# Patient Record
Sex: MALE | Race: WHITE | NOT HISPANIC OR LATINO | Employment: FULL TIME | URBAN - METROPOLITAN AREA
[De-identification: names, ages, dates, MRNs, and addresses within clinical notes are randomized per-mention and may not be internally consistent; named-entity substitution may affect disease eponyms.]

---

## 2017-05-12 ENCOUNTER — TRANSCRIBE ORDERS (OUTPATIENT)
Dept: ADMINISTRATIVE | Facility: HOSPITAL | Age: 37
End: 2017-05-12

## 2017-05-12 ENCOUNTER — ALLSCRIPTS OFFICE VISIT (OUTPATIENT)
Dept: OTHER | Facility: OTHER | Age: 37
End: 2017-05-12

## 2017-05-12 DIAGNOSIS — G89.29 CHRONIC LEFT-SIDED LOW BACK PAIN WITH LEFT-SIDED SCIATICA: Primary | ICD-10-CM

## 2017-05-12 DIAGNOSIS — M54.42 CHRONIC LEFT-SIDED LOW BACK PAIN WITH LEFT-SIDED SCIATICA: Primary | ICD-10-CM

## 2017-05-12 DIAGNOSIS — M54.40 LUMBAGO WITH SCIATICA: ICD-10-CM

## 2017-05-27 ENCOUNTER — HOSPITAL ENCOUNTER (OUTPATIENT)
Dept: MRI IMAGING | Facility: HOSPITAL | Age: 37
Discharge: HOME | End: 2017-05-27
Payer: COMMERCIAL

## 2017-05-27 DIAGNOSIS — M54.40 LUMBAGO WITH SCIATICA: ICD-10-CM

## 2017-05-27 PROCEDURE — 72148 MRI LUMBAR SPINE W/O DYE: CPT

## 2017-05-30 ENCOUNTER — GENERIC CONVERSION - ENCOUNTER (OUTPATIENT)
Dept: OTHER | Facility: OTHER | Age: 37
End: 2017-05-30

## 2017-06-21 ENCOUNTER — ALLSCRIPTS OFFICE VISIT (OUTPATIENT)
Dept: OTHER | Facility: OTHER | Age: 37
End: 2017-06-21

## 2017-07-10 ENCOUNTER — ALLSCRIPTS OFFICE VISIT (OUTPATIENT)
Dept: OTHER | Facility: OTHER | Age: 37
End: 2017-07-10

## 2017-07-10 DIAGNOSIS — M51.16 INTERVERTEBRAL DISC DISORDER WITH RADICULOPATHY OF LUMBAR REGION: ICD-10-CM

## 2017-07-28 ENCOUNTER — ALLSCRIPTS OFFICE VISIT (OUTPATIENT)
Dept: RADIOLOGY | Facility: CLINIC | Age: 37
End: 2017-07-28
Payer: COMMERCIAL

## 2017-08-11 ENCOUNTER — APPOINTMENT (OUTPATIENT)
Dept: PHYSICAL THERAPY | Facility: CLINIC | Age: 37
End: 2017-08-11
Payer: COMMERCIAL

## 2017-08-11 DIAGNOSIS — M51.16 INTERVERTEBRAL DISC DISORDER WITH RADICULOPATHY OF LUMBAR REGION: ICD-10-CM

## 2017-08-15 ENCOUNTER — APPOINTMENT (OUTPATIENT)
Dept: PHYSICAL THERAPY | Facility: CLINIC | Age: 37
End: 2017-08-15
Payer: COMMERCIAL

## 2017-08-16 ENCOUNTER — GENERIC CONVERSION - ENCOUNTER (OUTPATIENT)
Dept: OTHER | Facility: OTHER | Age: 37
End: 2017-08-16

## 2017-08-21 ENCOUNTER — APPOINTMENT (OUTPATIENT)
Dept: PHYSICAL THERAPY | Facility: CLINIC | Age: 37
End: 2017-08-21
Payer: COMMERCIAL

## 2017-08-24 ENCOUNTER — APPOINTMENT (OUTPATIENT)
Dept: PHYSICAL THERAPY | Facility: CLINIC | Age: 37
End: 2017-08-24
Payer: COMMERCIAL

## 2017-08-24 PROCEDURE — 97140 MANUAL THERAPY 1/> REGIONS: CPT

## 2017-08-24 PROCEDURE — 97110 THERAPEUTIC EXERCISES: CPT

## 2017-08-31 ENCOUNTER — APPOINTMENT (OUTPATIENT)
Dept: PHYSICAL THERAPY | Facility: CLINIC | Age: 37
End: 2017-08-31
Payer: COMMERCIAL

## 2017-08-31 PROCEDURE — 97110 THERAPEUTIC EXERCISES: CPT

## 2017-08-31 PROCEDURE — 97140 MANUAL THERAPY 1/> REGIONS: CPT

## 2017-09-06 ENCOUNTER — APPOINTMENT (OUTPATIENT)
Dept: PHYSICAL THERAPY | Facility: CLINIC | Age: 37
End: 2017-09-06
Payer: COMMERCIAL

## 2017-09-06 PROCEDURE — 97110 THERAPEUTIC EXERCISES: CPT

## 2017-09-06 PROCEDURE — 97140 MANUAL THERAPY 1/> REGIONS: CPT

## 2017-09-14 ENCOUNTER — APPOINTMENT (OUTPATIENT)
Dept: PHYSICAL THERAPY | Facility: CLINIC | Age: 37
End: 2017-09-14
Payer: COMMERCIAL

## 2017-09-14 PROCEDURE — 97140 MANUAL THERAPY 1/> REGIONS: CPT

## 2017-09-14 PROCEDURE — 97110 THERAPEUTIC EXERCISES: CPT

## 2017-09-18 ENCOUNTER — APPOINTMENT (OUTPATIENT)
Dept: PHYSICAL THERAPY | Facility: CLINIC | Age: 37
End: 2017-09-18
Payer: COMMERCIAL

## 2017-09-18 PROCEDURE — 97140 MANUAL THERAPY 1/> REGIONS: CPT

## 2017-09-18 PROCEDURE — 97110 THERAPEUTIC EXERCISES: CPT

## 2017-09-21 ENCOUNTER — APPOINTMENT (OUTPATIENT)
Dept: PHYSICAL THERAPY | Facility: CLINIC | Age: 37
End: 2017-09-21
Payer: COMMERCIAL

## 2017-09-21 PROCEDURE — 97110 THERAPEUTIC EXERCISES: CPT

## 2017-09-21 PROCEDURE — 97140 MANUAL THERAPY 1/> REGIONS: CPT

## 2017-09-22 ENCOUNTER — ALLSCRIPTS OFFICE VISIT (OUTPATIENT)
Dept: RADIOLOGY | Facility: CLINIC | Age: 37
End: 2017-09-22
Payer: COMMERCIAL

## 2017-09-25 ENCOUNTER — APPOINTMENT (OUTPATIENT)
Dept: PHYSICAL THERAPY | Facility: CLINIC | Age: 37
End: 2017-09-25
Payer: COMMERCIAL

## 2017-09-28 ENCOUNTER — APPOINTMENT (OUTPATIENT)
Dept: PHYSICAL THERAPY | Facility: CLINIC | Age: 37
End: 2017-09-28
Payer: COMMERCIAL

## 2017-09-28 PROCEDURE — 97140 MANUAL THERAPY 1/> REGIONS: CPT

## 2017-09-28 PROCEDURE — 97110 THERAPEUTIC EXERCISES: CPT

## 2017-10-02 ENCOUNTER — APPOINTMENT (OUTPATIENT)
Dept: PHYSICAL THERAPY | Facility: CLINIC | Age: 37
End: 2017-10-02
Payer: COMMERCIAL

## 2017-10-02 PROCEDURE — 97140 MANUAL THERAPY 1/> REGIONS: CPT

## 2017-10-02 PROCEDURE — 97110 THERAPEUTIC EXERCISES: CPT

## 2017-10-05 ENCOUNTER — APPOINTMENT (OUTPATIENT)
Dept: PHYSICAL THERAPY | Facility: CLINIC | Age: 37
End: 2017-10-05
Payer: COMMERCIAL

## 2017-10-05 PROCEDURE — 97140 MANUAL THERAPY 1/> REGIONS: CPT

## 2017-10-05 PROCEDURE — 97110 THERAPEUTIC EXERCISES: CPT

## 2017-10-09 ENCOUNTER — APPOINTMENT (OUTPATIENT)
Dept: PHYSICAL THERAPY | Facility: CLINIC | Age: 37
End: 2017-10-09
Payer: COMMERCIAL

## 2017-10-09 PROCEDURE — 97110 THERAPEUTIC EXERCISES: CPT

## 2017-10-09 PROCEDURE — 97140 MANUAL THERAPY 1/> REGIONS: CPT

## 2017-10-12 ENCOUNTER — APPOINTMENT (OUTPATIENT)
Dept: PHYSICAL THERAPY | Facility: CLINIC | Age: 37
End: 2017-10-12
Payer: COMMERCIAL

## 2017-10-12 PROCEDURE — 97140 MANUAL THERAPY 1/> REGIONS: CPT

## 2017-10-12 PROCEDURE — 97110 THERAPEUTIC EXERCISES: CPT

## 2017-10-18 ENCOUNTER — APPOINTMENT (OUTPATIENT)
Dept: PHYSICAL THERAPY | Facility: CLINIC | Age: 37
End: 2017-10-18
Payer: COMMERCIAL

## 2017-10-18 PROCEDURE — 97140 MANUAL THERAPY 1/> REGIONS: CPT

## 2017-10-18 PROCEDURE — 97110 THERAPEUTIC EXERCISES: CPT

## 2017-10-23 ENCOUNTER — APPOINTMENT (OUTPATIENT)
Dept: PHYSICAL THERAPY | Facility: CLINIC | Age: 37
End: 2017-10-23
Payer: COMMERCIAL

## 2017-10-23 PROCEDURE — 97110 THERAPEUTIC EXERCISES: CPT

## 2017-10-23 PROCEDURE — 97140 MANUAL THERAPY 1/> REGIONS: CPT

## 2017-10-26 ENCOUNTER — APPOINTMENT (OUTPATIENT)
Dept: PHYSICAL THERAPY | Facility: CLINIC | Age: 37
End: 2017-10-26
Payer: COMMERCIAL

## 2017-10-26 PROCEDURE — 97140 MANUAL THERAPY 1/> REGIONS: CPT

## 2017-10-26 PROCEDURE — 97110 THERAPEUTIC EXERCISES: CPT

## 2017-10-30 ENCOUNTER — APPOINTMENT (OUTPATIENT)
Dept: PHYSICAL THERAPY | Facility: CLINIC | Age: 37
End: 2017-10-30
Payer: COMMERCIAL

## 2017-11-02 ENCOUNTER — APPOINTMENT (OUTPATIENT)
Dept: PHYSICAL THERAPY | Facility: CLINIC | Age: 37
End: 2017-11-02
Payer: COMMERCIAL

## 2017-11-02 PROCEDURE — 97110 THERAPEUTIC EXERCISES: CPT

## 2017-11-02 PROCEDURE — 97140 MANUAL THERAPY 1/> REGIONS: CPT

## 2017-11-09 ENCOUNTER — APPOINTMENT (OUTPATIENT)
Dept: PHYSICAL THERAPY | Facility: CLINIC | Age: 37
End: 2017-11-09
Payer: COMMERCIAL

## 2017-11-09 PROCEDURE — G8981 BODY POS CURRENT STATUS: HCPCS

## 2017-11-09 PROCEDURE — 97140 MANUAL THERAPY 1/> REGIONS: CPT

## 2017-11-09 PROCEDURE — 97110 THERAPEUTIC EXERCISES: CPT

## 2017-11-09 PROCEDURE — G8982 BODY POS GOAL STATUS: HCPCS

## 2017-11-30 ENCOUNTER — APPOINTMENT (OUTPATIENT)
Dept: PHYSICAL THERAPY | Facility: CLINIC | Age: 37
End: 2017-11-30
Payer: COMMERCIAL

## 2017-12-01 ENCOUNTER — GENERIC CONVERSION - ENCOUNTER (OUTPATIENT)
Dept: OTHER | Facility: OTHER | Age: 37
End: 2017-12-01

## 2018-01-12 VITALS
RESPIRATION RATE: 18 BRPM | WEIGHT: 220.04 LBS | TEMPERATURE: 98.2 F | BODY MASS INDEX: 30.81 KG/M2 | HEART RATE: 78 BPM | SYSTOLIC BLOOD PRESSURE: 130 MMHG | DIASTOLIC BLOOD PRESSURE: 66 MMHG | OXYGEN SATURATION: 98 % | HEIGHT: 71 IN

## 2018-01-13 VITALS
RESPIRATION RATE: 12 BRPM | DIASTOLIC BLOOD PRESSURE: 62 MMHG | HEIGHT: 71 IN | SYSTOLIC BLOOD PRESSURE: 108 MMHG | HEART RATE: 68 BPM | WEIGHT: 227 LBS | BODY MASS INDEX: 31.78 KG/M2

## 2018-01-15 VITALS
WEIGHT: 223.04 LBS | SYSTOLIC BLOOD PRESSURE: 124 MMHG | BODY MASS INDEX: 31.23 KG/M2 | HEIGHT: 71 IN | HEART RATE: 72 BPM | DIASTOLIC BLOOD PRESSURE: 68 MMHG | TEMPERATURE: 99.2 F | RESPIRATION RATE: 16 BRPM

## 2018-01-15 NOTE — RESULT NOTES
Message   Bulging disc on L4-L5 pushing on nerve on the left side   I would like him to make an appt with pain management   Please given him the number for their office        Verified Results  * MRI LUMBAR SPINE [address] [id number] 06:55AM Demarcus Parprecious Order Number: [id number]   Performing Comments: >1 year of low back pain, left sided radiating to the right leg with numbness in the right foot   - Patient Instructions: To schedule this appointment, please contact Central Scheduling at [phone]  Test Name Result Flag Reference   MRI LUMBAR SPINE WO CONTRAST (Report)     MRI LUMBAR SPINE WITHOUT CONTRAST     INDICATION: M[age]: Lumbago with sciatica, unspecified side  History taken directly from the electronic ordering system  COMPARISON: Plain film performed on 8/22/2015     TECHNIQUE: Sagittal T1, sagittal T2, sagittal inversion recovery, axial T1 and axial T2, coronal T2       IMAGE QUALITY: Diagnostic     FINDINGS:   Counting reference: Lumbosacral Junction For the purposes of this report, L4-5 is considered the level of the iliac crest  As a result, there is presumed sacralization of the L5 vertebral body  ALIGNMENT: Slight levocurvature  No significant listhesis     MARROW SIGNAL: Marrow signal is within normal limits without signs of an infiltrative process  There is type I endplate changes along the left lateral margin of left L4-L5  Otherwise, no evidence for marrow edema pattern or signs of fracture  DISTAL CORD AND CONUS: Normal size and signal within the distal cord and conus  The conus ends at the T12 level  PARASPINAL SOFT TISSUES: Paraspinal soft tissues are unremarkable  SACRUM: Normal signal within the sacrum  No evidence of insufficiency or stress fracture  LOWER THORACIC DISC SPACES: Normal disc height and signal  No disc herniation, canal stenosis or foraminal narrowing  LUMBAR DISC SPACES:        L1-L2: No significant spinal canal stenosis   No right and no left neural foraminal stenosis  L2-L3: No significant spinal canal stenosis  No right and no left neural foraminal stenosis  L3-L4: Bulging of the annulus  Question a right foraminal protrusion  No significant spinal canal stenosis  Mild right and no significant left neural foraminal stenosis  L4-L5: Circumferential disc bulge  Left subarticular disc extrusion  Encroachment on the descending left L5 nerve roots within the subarticular and lateral recess  Bilateral facet arthropathy  Mild spinal canal stenosis  No right and no left neural   foraminal stenosis  L5-S1: No significant spinal canal stenosis  No right and no left neural foraminal stenosis  IMPRESSION:     Lumbar spondylosis centered at L4-L5 with a left subarticular disc extrusion encroaching on the descending left L5 nerve roots  Please note the presence of transitional lumbosacral anatomy  Correlate with plain film imaging prior to any intervention         Workstation performed: [id number]     Signed by:   Hieu Matthews MD   5/30/17       Plan  L4-L5 disc bulge    · *1 - [facility] Co-Management  *  Status: Active  Requested for:  [id number]  Care Summary provided  : Yes    Signatures   Electronically signed by : KAREN Sanchez ; May 30 2017  8:14PM EST                       (Author)

## 2018-01-22 VITALS
DIASTOLIC BLOOD PRESSURE: 72 MMHG | RESPIRATION RATE: 14 BRPM | SYSTOLIC BLOOD PRESSURE: 112 MMHG | TEMPERATURE: 97.9 F | HEART RATE: 70 BPM | BODY MASS INDEX: 31.64 KG/M2 | WEIGHT: 226 LBS | HEIGHT: 71 IN

## 2018-01-24 VITALS
RESPIRATION RATE: 14 BRPM | HEART RATE: 70 BPM | DIASTOLIC BLOOD PRESSURE: 72 MMHG | SYSTOLIC BLOOD PRESSURE: 112 MMHG | HEIGHT: 71 IN | TEMPERATURE: 97.9 F | WEIGHT: 226 LBS | BODY MASS INDEX: 31.64 KG/M2

## 2018-10-10 ENCOUNTER — TELEPHONE (OUTPATIENT)
Dept: INTERNAL MEDICINE CLINIC | Facility: CLINIC | Age: 38
End: 2018-10-10

## 2018-10-10 DIAGNOSIS — Z13.220 SCREENING, LIPID: Primary | ICD-10-CM

## 2018-10-10 DIAGNOSIS — Z13.1 SCREENING FOR DIABETES MELLITUS: ICD-10-CM

## 2018-10-10 NOTE — TELEPHONE ENCOUNTER
There is no "routine"  blood work  The only one I can order are for screening for diabetes and high cholesterol which may or may not be covered for him are a fasting sugar and lipid panel  If that is what he is referring to, I can order them but if he is looking for more like his blood counts, kidney, liver function, thyroid ,  then these are ordered based on symptoms or medical conditions

## 2018-10-10 NOTE — TELEPHONE ENCOUNTER
Pt states he has a difficult work schedule so it's hard for him to get into the office, so he was hoping to get the bloodwork done first and then come in  He states he doesn't have anything going on, feels well etc

## 2018-12-20 LAB
CHOLEST SERPL-MCNC: 127 MG/DL
CHOLEST/HDLC SERPL: 3.5 (CALC)
GLUCOSE P FAST SERPL-MCNC: 94 MG/DL (ref 65–99)
HDLC SERPL-MCNC: 36 MG/DL
LDLC SERPL CALC-MCNC: 71 MG/DL (CALC)
NONHDLC SERPL-MCNC: 91 MG/DL (CALC)
TRIGL SERPL-MCNC: 117 MG/DL

## 2019-02-07 ENCOUNTER — OFFICE VISIT (OUTPATIENT)
Dept: INTERNAL MEDICINE CLINIC | Facility: CLINIC | Age: 39
End: 2019-02-07
Payer: COMMERCIAL

## 2019-02-07 VITALS
HEART RATE: 87 BPM | DIASTOLIC BLOOD PRESSURE: 60 MMHG | RESPIRATION RATE: 16 BRPM | HEIGHT: 70 IN | WEIGHT: 229.6 LBS | BODY MASS INDEX: 32.87 KG/M2 | OXYGEN SATURATION: 98 % | SYSTOLIC BLOOD PRESSURE: 118 MMHG | TEMPERATURE: 97.4 F

## 2019-02-07 DIAGNOSIS — R13.10 DYSPHAGIA, UNSPECIFIED TYPE: Primary | ICD-10-CM

## 2019-02-07 PROBLEM — M51.16 INTERVERTEBRAL DISC DISORDER WITH RADICULOPATHY OF LUMBAR REGION: Status: ACTIVE | Noted: 2017-07-10

## 2019-02-07 PROCEDURE — 3008F BODY MASS INDEX DOCD: CPT | Performed by: INTERNAL MEDICINE

## 2019-02-07 PROCEDURE — 1036F TOBACCO NON-USER: CPT | Performed by: INTERNAL MEDICINE

## 2019-02-07 PROCEDURE — 99214 OFFICE O/P EST MOD 30 MIN: CPT | Performed by: INTERNAL MEDICINE

## 2019-02-07 NOTE — PROGRESS NOTES
Assessment/Plan:  Patient presenting with dysphagia for >6months without red flags like pain, weight loss  His exam is normal  Eat slowly and chew very well  Obtain barium swallow and if normal , will wait for a month or so and if this is persistent, will schedule with GI  Ulcer free diet discussed     Problem List Items Addressed This Visit     None      Visit Diagnoses     Dysphagia, unspecified type    -  Primary    Relevant Orders    Ambulatory referral to Gastroenterology    FL barium swallow            Subjective:      Patient ID: Kristin Garcia is a 43 y o  male  HPI  Sensation of food traveling slowly when he swallows with solids, not liquids  Gagged one time when he was eating pasta that he needed to push with water  He is thinking about it so much which may be part of the issue  Started in May when he was eating [place] food  Eating sushi in the summer and though he was "in trouble", same symptoms of slowness of food traveling his throat  No pain with swallowing, abdominal pain, changes in bowels, blood in the stool  Denies heartburn or acid reflux  He eats very fast and does not chew well  Worried about cancer    The following portions of the patient's history were reviewed and updated as appropriate: allergies, past family history, past medical history, past social history, past surgical history and problem list     Review of Systems   Constitutional: Positive for fatigue (at night)  Negative for fever and unexpected weight change  HENT: Positive for postnasal drip, sneezing (in the morning) and trouble swallowing  Negative for ear pain, hearing loss, sinus pain, sinus pressure and sore throat  Respiratory: Positive for cough (in the morning, trying to clear the mucus in his chest which can be violent)  Negative for shortness of breath and wheezing  Cardiovascular: Negative for chest pain, palpitations and leg swelling     Gastrointestinal: Negative for abdominal pain, blood in stool, constipation, diarrhea, nausea and vomiting  Genitourinary: Negative for difficulty urinating  Musculoskeletal: Positive for back pain  Negative for arthralgias and myalgias  Neurological: Positive for headaches (right sided headaches since this weekend)  Negative for dizziness  Psychiatric/Behavioral: Positive for sleep disturbance  Negative for dysphoric mood  Objective:      /60   Pulse 87   Temp (!) 97 4 °F (36 3 °C) (Oral)   Resp 16   Ht 5' 10 35" (1 787 m)   Wt 104 kg (229 lb 9 6 oz)   SpO2 98%   BMI 32 61 kg/m²          Physical Exam   Constitutional: He is oriented to person, place, and time  He appears well-developed and well-nourished  HENT:   Head: Normocephalic and atraumatic  Right Ear: External ear normal    Left Ear: External ear normal    Mouth/Throat: Oropharynx is clear and moist    Eyes: Conjunctivae are normal    Neck: Neck supple  Cardiovascular: Normal rate, regular rhythm and normal heart sounds  No murmur heard  Pulmonary/Chest: Effort normal and breath sounds normal  No respiratory distress  He has no wheezes  He has no rales  Abdominal: Soft  Bowel sounds are normal  He exhibits no distension and no mass  There is no tenderness  There is no rebound and no guarding  Musculoskeletal: Normal range of motion  Neurological: He is alert and oriented to person, place, and time  Skin: Skin is warm and dry  Psychiatric: He has a normal mood and affect   His behavior is normal  Judgment and thought content normal

## 2019-02-12 ENCOUNTER — HOSPITAL ENCOUNTER (OUTPATIENT)
Dept: RADIOLOGY | Facility: HOSPITAL | Age: 39
Discharge: HOME | End: 2019-02-12
Payer: COMMERCIAL

## 2019-02-12 DIAGNOSIS — R13.10 DYSPHAGIA, UNSPECIFIED TYPE: ICD-10-CM

## 2019-02-12 PROCEDURE — 74220 X-RAY XM ESOPHAGUS 1CNTRST: CPT

## 2019-08-08 DIAGNOSIS — Z23 NEED FOR TETANUS, DIPHTHERIA, AND ACELLULAR PERTUSSIS (TDAP) VACCINE: Primary | ICD-10-CM

## 2020-01-06 ENCOUNTER — OFFICE VISIT (OUTPATIENT)
Dept: INTERNAL MEDICINE CLINIC | Facility: CLINIC | Age: 40
End: 2020-01-06
Payer: COMMERCIAL

## 2020-01-06 VITALS
HEIGHT: 70 IN | SYSTOLIC BLOOD PRESSURE: 124 MMHG | TEMPERATURE: 98.3 F | BODY MASS INDEX: 32.3 KG/M2 | DIASTOLIC BLOOD PRESSURE: 70 MMHG | OXYGEN SATURATION: 96 % | WEIGHT: 225.6 LBS | HEART RATE: 95 BPM

## 2020-01-06 DIAGNOSIS — J06.9 VIRAL URI: Primary | ICD-10-CM

## 2020-01-06 PROCEDURE — 99213 OFFICE O/P EST LOW 20 MIN: CPT | Performed by: INTERNAL MEDICINE

## 2020-01-06 PROCEDURE — 3008F BODY MASS INDEX DOCD: CPT | Performed by: INTERNAL MEDICINE

## 2020-01-06 PROCEDURE — 1036F TOBACCO NON-USER: CPT | Performed by: INTERNAL MEDICINE

## 2020-01-06 NOTE — PROGRESS NOTES
Assessment/Plan:  Try Flonase  Cont Motrin PRN  Drink plenty of water  Call if not improving     Problem List Items Addressed This Visit     None      Visit Diagnoses     Viral URI    -  Primary            Subjective:      Patient ID: Leonie Shah is a 35 y o  male  HPI  Colds before Jaspreet with mild lingering symptoms  Cough and congestion in the morning especially  Tried Claritin a few times  4 days ago, started with neck pain followed by muslce aches and weakness  The following day body aches resolved and he had the sweats  He has been taking Motrin which helps with the neck pain  Today, neck pain woke him up  Also started with upper chest pain  He has been taking Motrin which makes him sweat    The following portions of the patient's history were reviewed and updated as appropriate: allergies, past family history, past medical history, past social history, past surgical history and problem list     Review of Systems   Constitutional: Positive for chills  Negative for fever  HENT: Positive for congestion, postnasal drip and sinus pressure  Negative for ear pain and sore throat  Respiratory: Positive for cough  Cardiovascular: Positive for chest pain  Gastrointestinal: Negative for abdominal pain, diarrhea, nausea and vomiting  Genitourinary: Negative for difficulty urinating  Musculoskeletal: Positive for myalgias and neck pain  Objective:      /70   Pulse 95   Temp 98 3 °F (36 8 °C)   Ht 5' 10 35" (1 787 m)   Wt 102 kg (225 lb 9 6 oz)   SpO2 96%   BMI 32 05 kg/m²          Physical Exam   Constitutional: He is oriented to person, place, and time  He appears well-developed and well-nourished  HENT:   Head: Normocephalic and atraumatic  Right Ear: External ear normal    Left Ear: External ear normal    Mouth/Throat: Oropharynx is clear and moist    Eyes: Conjunctivae are normal    Neck: Neck supple     Cardiovascular: Normal rate, regular rhythm and normal heart sounds  No murmur heard  No chest wall tenderness   Pulmonary/Chest: Effort normal and breath sounds normal  No respiratory distress  He has no wheezes  He has no rales  Abdominal: Soft  Musculoskeletal: Normal range of motion  Neurological: He is alert and oriented to person, place, and time  He has normal strength  No cranial nerve deficit  Right eye appears droopy but facial muscle strength all normal   Skin: Skin is warm and dry  Psychiatric: His behavior is normal  Judgment and thought content normal  His mood appears anxious

## 2020-06-08 ENCOUNTER — TELEPHONE (OUTPATIENT)
Dept: INTERNAL MEDICINE CLINIC | Facility: CLINIC | Age: 40
End: 2020-06-08

## 2020-06-10 ENCOUNTER — OFFICE VISIT (OUTPATIENT)
Dept: INTERNAL MEDICINE CLINIC | Facility: CLINIC | Age: 40
End: 2020-06-10
Payer: COMMERCIAL

## 2020-06-10 ENCOUNTER — TELEPHONE (OUTPATIENT)
Dept: INTERNAL MEDICINE CLINIC | Facility: CLINIC | Age: 40
End: 2020-06-10

## 2020-06-10 VITALS
RESPIRATION RATE: 16 BRPM | HEART RATE: 71 BPM | HEIGHT: 70 IN | DIASTOLIC BLOOD PRESSURE: 78 MMHG | BODY MASS INDEX: 31.09 KG/M2 | OXYGEN SATURATION: 98 % | SYSTOLIC BLOOD PRESSURE: 130 MMHG | WEIGHT: 217.2 LBS | TEMPERATURE: 98.2 F

## 2020-06-10 DIAGNOSIS — M51.16 INTERVERTEBRAL DISC DISORDER WITH RADICULOPATHY OF LUMBAR REGION: ICD-10-CM

## 2020-06-10 DIAGNOSIS — H61.23 BILATERAL IMPACTED CERUMEN: ICD-10-CM

## 2020-06-10 DIAGNOSIS — Z00.00 WELLNESS EXAMINATION: Primary | ICD-10-CM

## 2020-06-10 PROCEDURE — 86580 TB INTRADERMAL TEST: CPT

## 2020-06-10 PROCEDURE — 99396 PREV VISIT EST AGE 40-64: CPT | Performed by: GENERAL ACUTE CARE HOSPITAL

## 2020-06-10 PROCEDURE — 3008F BODY MASS INDEX DOCD: CPT | Performed by: GENERAL ACUTE CARE HOSPITAL

## 2020-06-11 ENCOUNTER — TELEPHONE (OUTPATIENT)
Dept: INTERNAL MEDICINE CLINIC | Facility: CLINIC | Age: 40
End: 2020-06-11

## 2020-06-11 LAB
BASOPHILS # BLD AUTO: 70 CELLS/UL (ref 0–200)
BASOPHILS NFR BLD AUTO: 1.2 %
BUN SERPL-MCNC: 18 MG/DL (ref 7–25)
BUN/CREAT SERPL: NORMAL (CALC) (ref 6–22)
CALCIUM SERPL-MCNC: 9.3 MG/DL (ref 8.6–10.3)
CHLORIDE SERPL-SCNC: 104 MMOL/L (ref 98–110)
CHOLEST SERPL-MCNC: 158 MG/DL
CHOLEST/HDLC SERPL: 3.7 (CALC)
CO2 SERPL-SCNC: 30 MMOL/L (ref 20–32)
CREAT SERPL-MCNC: 1.07 MG/DL (ref 0.6–1.35)
EOSINOPHIL # BLD AUTO: 180 CELLS/UL (ref 15–500)
EOSINOPHIL NFR BLD AUTO: 3.1 %
ERYTHROCYTE [DISTWIDTH] IN BLOOD BY AUTOMATED COUNT: 13.2 % (ref 11–15)
GLUCOSE SERPL-MCNC: 95 MG/DL (ref 65–99)
HBA1C MFR BLD: 5.3 % OF TOTAL HGB
HCT VFR BLD AUTO: 42.2 % (ref 38.5–50)
HDLC SERPL-MCNC: 43 MG/DL
HGB BLD-MCNC: 14.2 G/DL (ref 13.2–17.1)
HIV 1+2 AB+HIV1 P24 AG SERPL QL IA: NORMAL
LDLC SERPL CALC-MCNC: 92 MG/DL (CALC)
LYMPHOCYTES # BLD AUTO: 1873 CELLS/UL (ref 850–3900)
LYMPHOCYTES NFR BLD AUTO: 32.3 %
MCH RBC QN AUTO: 29.2 PG (ref 27–33)
MCHC RBC AUTO-ENTMCNC: 33.6 G/DL (ref 32–36)
MCV RBC AUTO: 86.7 FL (ref 80–100)
MONOCYTES # BLD AUTO: 429 CELLS/UL (ref 200–950)
MONOCYTES NFR BLD AUTO: 7.4 %
NEUTROPHILS # BLD AUTO: 3248 CELLS/UL (ref 1500–7800)
NEUTROPHILS NFR BLD AUTO: 56 %
NONHDLC SERPL-MCNC: 115 MG/DL (CALC)
PLATELET # BLD AUTO: 242 THOUSAND/UL (ref 140–400)
PMV BLD REES-ECKER: 11 FL (ref 7.5–12.5)
POTASSIUM SERPL-SCNC: 4.6 MMOL/L (ref 3.5–5.3)
RBC # BLD AUTO: 4.87 MILLION/UL (ref 4.2–5.8)
SL AMB EGFR AFRICAN AMERICAN: 99 ML/MIN/1.73M2
SL AMB EGFR NON AFRICAN AMERICAN: 86 ML/MIN/1.73M2
SODIUM SERPL-SCNC: 139 MMOL/L (ref 135–146)
TRIGL SERPL-MCNC: 126 MG/DL
WBC # BLD AUTO: 5.8 THOUSAND/UL (ref 3.8–10.8)

## 2020-06-12 ENCOUNTER — CLINICAL SUPPORT (OUTPATIENT)
Dept: INTERNAL MEDICINE CLINIC | Facility: CLINIC | Age: 40
End: 2020-06-12
Payer: COMMERCIAL

## 2020-06-12 VITALS — TEMPERATURE: 97.8 F

## 2020-06-12 DIAGNOSIS — Z23 NEED FOR VACCINATION: Primary | ICD-10-CM

## 2020-06-12 LAB
INDURATION: 0 MM
TB SKIN TEST: NEGATIVE

## 2020-06-12 PROCEDURE — 90471 IMMUNIZATION ADMIN: CPT

## 2020-06-12 PROCEDURE — 90715 TDAP VACCINE 7 YRS/> IM: CPT

## 2021-03-30 DIAGNOSIS — Z23 ENCOUNTER FOR IMMUNIZATION: ICD-10-CM

## 2023-03-24 ENCOUNTER — TELEPHONE (OUTPATIENT)
Dept: OTHER | Facility: OTHER | Age: 43
End: 2023-03-24

## 2023-03-24 NOTE — TELEPHONE ENCOUNTER
Call from patient asking if there are tests or labs he should go and have taken prior to his appointment next Friday? Please return his call

## 2023-03-31 ENCOUNTER — OFFICE VISIT (OUTPATIENT)
Dept: INTERNAL MEDICINE CLINIC | Facility: CLINIC | Age: 43
End: 2023-03-31

## 2023-03-31 VITALS
HEIGHT: 70 IN | HEART RATE: 70 BPM | BODY MASS INDEX: 34.5 KG/M2 | RESPIRATION RATE: 16 BRPM | DIASTOLIC BLOOD PRESSURE: 78 MMHG | SYSTOLIC BLOOD PRESSURE: 118 MMHG | OXYGEN SATURATION: 96 % | WEIGHT: 241 LBS

## 2023-03-31 DIAGNOSIS — J30.89 NON-SEASONAL ALLERGIC RHINITIS, UNSPECIFIED TRIGGER: ICD-10-CM

## 2023-03-31 DIAGNOSIS — Z00.00 ANNUAL PHYSICAL EXAM: Primary | ICD-10-CM

## 2023-03-31 DIAGNOSIS — Z13.220 SCREENING, LIPID: ICD-10-CM

## 2023-03-31 DIAGNOSIS — K21.9 GASTROESOPHAGEAL REFLUX DISEASE, UNSPECIFIED WHETHER ESOPHAGITIS PRESENT: ICD-10-CM

## 2023-03-31 DIAGNOSIS — E55.9 VITAMIN D DEFICIENCY: ICD-10-CM

## 2023-03-31 DIAGNOSIS — R53.82 CHRONIC FATIGUE: ICD-10-CM

## 2023-03-31 DIAGNOSIS — R05.3 CHRONIC COUGH: ICD-10-CM

## 2023-03-31 RX ORDER — FLUTICASONE PROPIONATE 50 MCG
2 SPRAY, SUSPENSION (ML) NASAL
Qty: 16 G | Refills: 3 | Status: SHIPPED | OUTPATIENT
Start: 2023-03-31

## 2023-03-31 RX ORDER — FAMOTIDINE 20 MG/1
20 TABLET, FILM COATED ORAL 2 TIMES DAILY
Qty: 60 TABLET | Refills: 3 | Status: SHIPPED | OUTPATIENT
Start: 2023-03-31

## 2023-03-31 NOTE — PROGRESS NOTES
[facility]    NAME: Emily Bautista  AGE: 36 y o  SEX: male  : 1980     DATE: 3/31/2023     Assessment and Plan:     Problem List Items Addressed This Visit    None  Visit Diagnoses     Annual physical exam    -  Primary    Chronic cough        Relevant Orders    Ambulatory Referral to Allergy    XR chest pa & lateral    Chronic fatigue        Relevant Orders    CBC and differential    Comprehensive metabolic panel    TSH, 3rd generation with Free T4 reflex    Screening, lipid        Relevant Orders    Lipid Panel with Direct LDL reflex    Non-seasonal allergic rhinitis, unspecified trigger        Relevant Medications    fluticasone (FLONASE) 50 mcg/act nasal spray    Other Relevant Orders    Ambulatory Referral to Allergy    Gastroesophageal reflux disease, unspecified whether esophagitis present        Relevant Medications    famotidine (PEPCID) 20 mg tablet    Vitamin D deficiency        Relevant Orders    Vitamin D 25 hydroxy          Immunizations and preventive care screenings were discussed with patient today  Appropriate education was printed on patient's after visit summary  Counseling:  Exercise: the importance of regular exercise/physical activity was discussed  Recommend exercise 3-5 times per week for at least 30 minutes  BMI Counseling: Body mass index is 34 58 kg/m²  The BMI is above normal  Nutrition recommendations include encouraging healthy choices of fruits and vegetables, consuming healthier snacks, limiting drinks that contain sugar, moderation in carbohydrate intake and reducing intake of saturated and trans fat  Exercise recommendations include exercising 3-5 times per week  Rationale for BMI follow-up plan is due to patient being overweight or obese  Depression Screening and Follow-up Plan: Patient was screened for depression during today's encounter   They screened negative with a PHQ-2 score of 0  Stressed importance of weight loss to improve symptoms    Start nasal steroid and H2 blocker daily    See allergist check for asthma, allergies    Return in about 3 months (around 6/30/2023)  Chief Complaint:     Chief Complaint   Patient presents with   • Annual Exam      History of Present Illness:     Adult Annual Physical   Patient here for a comprehensive physical exam  The patient reports problems - fatigue, lingering cough when he gets sinusitis/URIs, change in bowel movements  Diet and Physical Activity  Diet/Nutrition: poor diet  Exercise: no formal exercise  Depression Screening  PHQ-2/9 Depression Screening    Little interest or pleasure in doing things: 0 - not at all  Feeling down, depressed, or hopeless: 0 - not at all  PHQ-2 Score: 0  PHQ-2 Interpretation: Negative depression screen       General Health  Sleep: 6 h on ave  Hearing: normal - bilateral   Vision: most recent eye exam >1 year ago and wears glasses  Dental: regular dental visits   Health  Symptoms include: none     Review of Systems:     Review of Systems   Constitutional: Positive for fatigue (+snoring)  Negative for fever and unexpected weight change  HENT: Positive for postnasal drip  Negative for congestion and hearing loss  Respiratory: Positive for cough  Negative for shortness of breath  Cardiovascular: Negative for chest pain and palpitations  Gastrointestinal: Negative for abdominal pain, constipation, diarrhea, nausea and vomiting  Change in bowel movements-some mornings, has to have 2BMs  Frequent heartburn, wakes him up at night   Genitourinary: Negative for difficulty urinating  Musculoskeletal: Positive for arthralgias (elbows) and back pain  Psychiatric/Behavioral: Negative for dysphoric mood        Past Medical History:     Past Medical History:   Diagnosis Date   • GERD (gastroesophageal reflux disease)    • Nasal congestion       Past Surgical History:     Past "Surgical History:   Procedure Laterality Date   • NO PAST SURGERIES        Family History:     Family History   Problem Relation Age of Onset   • No Known Problems Mother    • Colon cancer Maternal Grandmother    • Lymphoma Maternal Grandmother    • Uterine cancer Maternal Grandmother    • Cancer Paternal Grandmother       Social History:     Social History     Socioeconomic History   • Marital status: /Civil Union     Spouse name: None   • Number of children: None   • Years of education: None   • Highest education level: None   Occupational History     Employer: [employer]   Tobacco Use   • Smoking status: Never   • Smokeless tobacco: Never   Substance and Sexual Activity   • Alcohol use: Yes     Comment: Social   • Drug use: No   • Sexual activity: None   Other Topics Concern   • None   Social History Narrative    Lives with family     Social Determinants of Health     Financial Resource Strain: Not on file   Food Insecurity: Not on file   Transportation Needs: Not on file   Physical Activity: Not on file   Stress: Not on file   Social Connections: Not on file   Intimate Partner Violence: Not on file   Housing Stability: Not on file      Current Medications:     Current Outpatient Medications   Medication Sig Dispense Refill   • famotidine (PEPCID) 20 mg tablet Take 1 tablet (20 mg total) by mouth 2 (two) times a day 60 tablet 3   • fluticasone (FLONASE) 50 mcg/act nasal spray 2 sprays into each nostril daily at bedtime 16 g 3     No current facility-administered medications for this visit  Allergies:      Allergies   Allergen Reactions   • Dairy Aid  [Tilactase]      Other reaction(s): GI Upset  Annotation - [id number]: Milk   • Food      Other reaction(s): GI Upset  Annotation - [id number]: Onions   • Pollen Extract       Physical Exam:     /78   Pulse 70   Resp 16   Ht 5' 10\" (1 778 m)   Wt 109 kg (241 lb)   SpO2 96%   BMI 34 58 kg/m²     Physical Exam  Vitals and nursing note " reviewed  Constitutional:       General: He is not in acute distress  Appearance: He is well-developed  He is not ill-appearing, toxic-appearing or diaphoretic  HENT:      Head: Normocephalic and atraumatic  Eyes:      Conjunctiva/sclera: Conjunctivae normal    Cardiovascular:      Rate and Rhythm: Normal rate and regular rhythm  Heart sounds: No murmur heard  Pulmonary:      Effort: Pulmonary effort is normal  No respiratory distress  Breath sounds: Normal breath sounds  Abdominal:      Palpations: Abdomen is soft  Tenderness: There is no abdominal tenderness  Musculoskeletal:         General: No swelling  Cervical back: Neck supple  Skin:     General: Skin is warm and dry  Capillary Refill: Capillary refill takes less than 2 seconds  Neurological:      Mental Status: He is alert     Psychiatric:         Mood and Affect: Mood normal           Berto Trejo MD  [facility]

## 2023-09-11 PROBLEM — M51.369 L4-L5 DISC BULGE: Status: ACTIVE | Noted: 2017-05-30

## 2023-09-11 PROBLEM — M51.36 L4-L5 DISC BULGE: Status: ACTIVE | Noted: 2017-05-30

## 2023-09-11 PROBLEM — M47.816 FACET DEGENERATION OF LUMBAR REGION: Status: ACTIVE | Noted: 2017-12-01

## 2023-10-04 ENCOUNTER — APPOINTMENT (OUTPATIENT)
Dept: LAB | Facility: AMBULARY SURGERY CENTER | Age: 43
End: 2023-10-04
Payer: COMMERCIAL

## 2023-10-04 DIAGNOSIS — E55.9 VITAMIN D DEFICIENCY: ICD-10-CM

## 2023-10-04 DIAGNOSIS — Z13.220 SCREENING, LIPID: ICD-10-CM

## 2023-10-04 DIAGNOSIS — R53.82 CHRONIC FATIGUE: ICD-10-CM

## 2023-10-04 LAB
25(OH)D3 SERPL-MCNC: 38.4 NG/ML (ref 30–100)
ALBUMIN SERPL BCP-MCNC: 4.1 G/DL (ref 3.5–5)
ALP SERPL-CCNC: 59 U/L (ref 34–104)
ALT SERPL W P-5'-P-CCNC: 30 U/L (ref 7–52)
ANION GAP SERPL CALCULATED.3IONS-SCNC: 9 MMOL/L
AST SERPL W P-5'-P-CCNC: 20 U/L (ref 13–39)
BASOPHILS # BLD AUTO: 0.07 THOUSANDS/ÂΜL (ref 0–0.1)
BASOPHILS NFR BLD AUTO: 1 % (ref 0–1)
BILIRUB SERPL-MCNC: 0.63 MG/DL (ref 0.2–1)
BUN SERPL-MCNC: 16 MG/DL (ref 5–25)
CALCIUM SERPL-MCNC: 9.3 MG/DL (ref 8.4–10.2)
CHLORIDE SERPL-SCNC: 104 MMOL/L (ref 96–108)
CHOLEST SERPL-MCNC: 147 MG/DL
CO2 SERPL-SCNC: 28 MMOL/L (ref 21–32)
CREAT SERPL-MCNC: 1.14 MG/DL (ref 0.6–1.3)
EOSINOPHIL # BLD AUTO: 0.39 THOUSAND/ÂΜL (ref 0–0.61)
EOSINOPHIL NFR BLD AUTO: 6 % (ref 0–6)
ERYTHROCYTE [DISTWIDTH] IN BLOOD BY AUTOMATED COUNT: 13.2 % (ref 11.6–15.1)
GFR SERPL CREATININE-BSD FRML MDRD: 77 ML/MIN/1.73SQ M
GLUCOSE P FAST SERPL-MCNC: 99 MG/DL (ref 65–99)
HCT VFR BLD AUTO: 43.2 % (ref 36.5–49.3)
HDLC SERPL-MCNC: 37 MG/DL
HGB BLD-MCNC: 14.2 G/DL (ref 12–17)
IMM GRANULOCYTES # BLD AUTO: 0.03 THOUSAND/UL (ref 0–0.2)
IMM GRANULOCYTES NFR BLD AUTO: 0 % (ref 0–2)
LDLC SERPL CALC-MCNC: 82 MG/DL (ref 0–100)
LYMPHOCYTES # BLD AUTO: 2.2 THOUSANDS/ÂΜL (ref 0.6–4.47)
LYMPHOCYTES NFR BLD AUTO: 32 % (ref 14–44)
MCH RBC QN AUTO: 29.2 PG (ref 26.8–34.3)
MCHC RBC AUTO-ENTMCNC: 32.9 G/DL (ref 31.4–37.4)
MCV RBC AUTO: 89 FL (ref 82–98)
MONOCYTES # BLD AUTO: 0.59 THOUSAND/ÂΜL (ref 0.17–1.22)
MONOCYTES NFR BLD AUTO: 9 % (ref 4–12)
NEUTROPHILS # BLD AUTO: 3.62 THOUSANDS/ÂΜL (ref 1.85–7.62)
NEUTS SEG NFR BLD AUTO: 52 % (ref 43–75)
NRBC BLD AUTO-RTO: 0 /100 WBCS
PLATELET # BLD AUTO: 253 THOUSANDS/UL (ref 149–390)
PMV BLD AUTO: 11 FL (ref 8.9–12.7)
POTASSIUM SERPL-SCNC: 4.1 MMOL/L (ref 3.5–5.3)
PROT SERPL-MCNC: 7.4 G/DL (ref 6.4–8.4)
RBC # BLD AUTO: 4.87 MILLION/UL (ref 3.88–5.62)
SODIUM SERPL-SCNC: 141 MMOL/L (ref 135–147)
TRIGL SERPL-MCNC: 138 MG/DL
TSH SERPL DL<=0.05 MIU/L-ACNC: 1.46 UIU/ML (ref 0.45–4.5)
WBC # BLD AUTO: 6.9 THOUSAND/UL (ref 4.31–10.16)

## 2023-10-04 PROCEDURE — 82306 VITAMIN D 25 HYDROXY: CPT

## 2023-10-04 PROCEDURE — 80053 COMPREHEN METABOLIC PANEL: CPT

## 2023-10-04 PROCEDURE — 85025 COMPLETE CBC W/AUTO DIFF WBC: CPT

## 2023-10-04 PROCEDURE — 84443 ASSAY THYROID STIM HORMONE: CPT

## 2023-10-04 PROCEDURE — 36415 COLL VENOUS BLD VENIPUNCTURE: CPT

## 2023-10-04 PROCEDURE — 80061 LIPID PANEL: CPT

## 2023-10-05 ENCOUNTER — OFFICE VISIT (OUTPATIENT)
Dept: INTERNAL MEDICINE CLINIC | Facility: CLINIC | Age: 43
End: 2023-10-05
Payer: COMMERCIAL

## 2023-10-05 VITALS
SYSTOLIC BLOOD PRESSURE: 110 MMHG | TEMPERATURE: 97.8 F | HEART RATE: 74 BPM | HEIGHT: 72 IN | BODY MASS INDEX: 32.02 KG/M2 | OXYGEN SATURATION: 96 % | DIASTOLIC BLOOD PRESSURE: 70 MMHG | WEIGHT: 236.4 LBS

## 2023-10-05 DIAGNOSIS — R05.3 CHRONIC COUGH: ICD-10-CM

## 2023-10-05 DIAGNOSIS — R09.82 POST-NASAL DRIP: Primary | ICD-10-CM

## 2023-10-05 DIAGNOSIS — E78.5 DYSLIPIDEMIA: ICD-10-CM

## 2023-10-05 DIAGNOSIS — Z23 NEED FOR VACCINATION: ICD-10-CM

## 2023-10-05 PROBLEM — Z00.00 WELLNESS EXAMINATION: Status: RESOLVED | Noted: 2020-06-10 | Resolved: 2023-10-05

## 2023-10-05 PROCEDURE — 99213 OFFICE O/P EST LOW 20 MIN: CPT | Performed by: INTERNAL MEDICINE

## 2023-10-05 PROCEDURE — 90686 IIV4 VACC NO PRSV 0.5 ML IM: CPT

## 2023-10-05 PROCEDURE — 90471 IMMUNIZATION ADMIN: CPT

## 2023-10-05 NOTE — PATIENT INSTRUCTIONS
Mediterranean Diet   AMBULATORY CARE:   A Mediterranean diet  is a meal plan that includes foods that are commonly eaten in countries that border the [facility]. This meal plan may provide several health benefits. These include losing or maintaining weight, and decreasing blood pressure, blood sugar, and cholesterol levels. It may also help protect against certain health conditions such as heart disease, cancer, type 2 diabetes, and Alzheimer disease. Work with a dietitian to develop a meal plan that is right for you. Foods to include in the [address] [place] [address] diet:   Include fruits and vegetables in each meal.  Eat a variety of fresh fruits and vegetables. Choose whole grains every day. These foods include whole-grain breads, pastas, and cereals. It also includes brown rice, quinoa, and millet. Use unsaturated fats instead of saturated fats. Cook with olive or canola oil. Limit saturated fats, such as butter, margarine, and shortening. Saturated fat is an unhealthy fat that can increase your cholesterol levels. Choose plant foods, poultry, and fish as your main sources of protein. Eat plant-based foods that provide protein,  such as lentils, beans, chickpeas, nuts, and seeds. Choose mostly plant-based foods in place of meat on most days of the week. Eat protein foods high in omega-3 fats. Fish high in omega-3 fats include salmon, trout, and tuna. Include these types of fish 1 or 2 times each week. Limit fish high in mercury, such as shark, swordfish, tilefish, and maria del rosario mackerel. Omega-3 fats are also found in walnuts and flaxseed. Choose poultry (chicken or turkey)  without skin instead of red meat. Red meat is high in saturated fat. Limit eggs and high-fat meats, such as [place], sausage, and hot dogs. Choose low-fat dairy foods  such as nonfat or 1% milk, or low-fat almond, cashew, or soy milk. Other examples include low-fat cheese, yogurt, and cottage cheese. Limit sweets. Limit your intake of high-sugar foods, such as soda, desserts, and candy. Talk to your healthcare provider about alcohol. Studies have shown that moderate intake of wine may reduce the risk of heart disease. A moderate amount of wine is 1 serving for women and men 65 years and older each day. Two servings is recommended for men 24to 59years of age each day. A serving of wine is 5 ounces. Other things you need to know if you follow the Mediterranean diet:   Include foods high in iron and vitamin C.  Plant-based foods that are high in iron include spinach, beans, tofu, and artichoke. Eat a serving of vitamin C with any iron-rich food to help your body absorb more iron. Examples include oranges, strawberries, cantaloupe, broccoli, and yellow peppers. Get regular physical activity. The Mediterranean diet will have the most benefit if you get regular physical activity. Get 30 minutes of physical activity at least 5 days a week. Choose physical activities that increase your heart rate. Examples include walking, hiking, swimming, and riding a bike. Ask your healthcare provider about the best exercise plan for you. © Copyright [facility] 2023 Information is for End User's use only and may not be sold, redistributed or otherwise used for commercial purposes. The above information is an  only. It is not intended as medical advice for individual conditions or treatments. Talk to your doctor, nurse or pharmacist before following any medical regimen to see if it is safe and effective for you.

## 2023-10-05 NOTE — PROGRESS NOTES
Assessment/Plan:  Advised to try nasal sprays prescribed by allergist  Avoid large meals at night and raise head of bed. Weight loss advised. These measures can reduce reflux. Mediterranean diet and exercise to improve good cholesterol       Problem List Items Addressed This Visit    None  Visit Diagnoses     Post-nasal drip    -  Primary    Chronic cough        Dyslipidemia        Need for vaccination        Relevant Orders    influenza vaccine, quadrivalent, 0.5 mL, preservative-free, for adult and pediatric patients 6 mos+ (AFLURIA, FLUARIX, FLULAVAL, FLUZONE)            Subjective:      Patient ID: Jessica Ashrfa is a 39 y.o. male. HPI  Here for a follow up  Mild cough in the morning from post nasal drip  He saw the allergist a few weeks ago and prescribed nasal sprays but he has not used them. Lifestyle changes also recommended to prevent reflux  He has less heartburn from 6months ago  Recent labs reviewed -normal except for HDL being slightly low    The following portions of the patient's history were reviewed and updated as appropriate: allergies, current medications, past family history, past medical history, past social history, past surgical history and problem list.    Review of Systems   HENT: Positive for postnasal drip. Respiratory: Positive for cough. Negative for shortness of breath and wheezing. Cardiovascular: Negative for chest pain and palpitations. Gastrointestinal: Negative for abdominal pain, blood in stool, constipation and diarrhea. Musculoskeletal: Positive for back pain (spasms, tenderness over the flanks). Objective:      /70 (BP Location: Left arm, Patient Position: Sitting, Cuff Size: Large)   Pulse 74   Temp 97.8 °F (36.6 °C) (Oral)   Ht 5' 11.65" (1.82 m)   Wt 107 kg (236 lb 6.4 oz)   SpO2 96%   BMI 32.37 kg/m²          Physical Exam  Constitutional:       Appearance: He is well-developed. He is not ill-appearing.    Eyes:      Conjunctiva/sclera: Conjunctivae normal.   Cardiovascular:      Rate and Rhythm: Normal rate and regular rhythm. Heart sounds: Normal heart sounds. No murmur heard. Pulmonary:      Effort: Pulmonary effort is normal. No respiratory distress. Breath sounds: Normal breath sounds. No wheezing or rales. Abdominal:      General: There is no distension. Palpations: Abdomen is soft. There is no mass. Tenderness: There is no abdominal tenderness. There is no guarding or rebound. Musculoskeletal:      Cervical back: Neck supple. Right lower leg: No edema. Left lower leg: No edema. Neurological:      Mental Status: He is alert and oriented to person, place, and time. Psychiatric:         Mood and Affect: Mood normal.         Behavior: Behavior normal.         Thought Content:  Thought content normal.         Judgment: Judgment normal.

## 2023-12-15 ENCOUNTER — OFFICE VISIT (OUTPATIENT)
Dept: INTERNAL MEDICINE CLINIC | Facility: CLINIC | Age: 43
End: 2023-12-15
Payer: COMMERCIAL

## 2023-12-15 VITALS
OXYGEN SATURATION: 96 % | WEIGHT: 240.6 LBS | HEIGHT: 71 IN | SYSTOLIC BLOOD PRESSURE: 112 MMHG | HEART RATE: 77 BPM | DIASTOLIC BLOOD PRESSURE: 72 MMHG | BODY MASS INDEX: 33.68 KG/M2

## 2023-12-15 DIAGNOSIS — R05.1 ACUTE COUGH: ICD-10-CM

## 2023-12-15 DIAGNOSIS — B96.89 BACTERIAL SINUSITIS: Primary | ICD-10-CM

## 2023-12-15 DIAGNOSIS — J32.9 BACTERIAL SINUSITIS: Primary | ICD-10-CM

## 2023-12-15 PROCEDURE — 99213 OFFICE O/P EST LOW 20 MIN: CPT | Performed by: INTERNAL MEDICINE

## 2023-12-15 RX ORDER — AMOXICILLIN AND CLAVULANATE POTASSIUM 875; 125 MG/1; MG/1
1 TABLET, FILM COATED ORAL EVERY 12 HOURS SCHEDULED
Qty: 20 TABLET | Refills: 0 | Status: SHIPPED | OUTPATIENT
Start: 2023-12-15 | End: 2023-12-25

## 2023-12-15 NOTE — PROGRESS NOTES
Name: Kieran Kearns      : 1980      MRN: [id number]  Encounter Provider: Elena Calvillo MD  Encounter Date: 12/15/2023   Enc[facility] department: [facility]    Assessment & Plan     1. Bacterial sinusitis  -     amoxicillin-clavulanate (AUGMENTIN) 875-125 mg per tablet; Take 1 tablet by mouth every 12 (twelve) hours for 10 days    2. Acute cough    -You will be started on a course of Augmentin for a sinus infection  -Please restart your Nasonex and Astelin nasal sprays  -Use the saline nasal spray throughout the day as needed  -Recommend performing daily saline nasal rinses (NeilMed)       Subjective      HPI  Patient presents today complaining of chronic cough over the past 6 weeks with sinus pressure and discomfort. He reports he has a longstanding history of allergies. He was seen by an allergist in September and was started on Nasonex and Astelin. He reports he is not currently taking them since when he became ill in October he thought it was best to hold off on taking these medications until he felt better. He currently denies any fevers or chills. He notes with coughing and blowing his nose he gets yellowish-green mucus. All other systems negative except for pertinent findings noted in HPI.        Current Outpatient Medications on File Prior to Visit   Medication Sig   • azelastine (ASTELIN) 0.1 % nasal spray 2 sprays into each nostril daily Use in each nostril as directed   • mometasone (NASONEX) 50 mcg/act nasal spray 2 sprays into each nostril daily   • famotidine (PEPCID) 20 mg tablet Take 1 tablet (20 mg total) by mouth 2 (two) times a day (Patient not taking: Reported on 10/5/2023)       Objective     /72   Pulse 77   Ht 5' 11" (1.803 m)   Wt 109 kg (240 lb 9.6 oz)   SpO2 96%   BMI 33.56 kg/m²     BP Readings from Last 3 Encounters:   12/15/23 112/72   10/05/23 110/70   23 114/70        Wt Readings from Last 3 Encounters:   12/15/23 109 kg (240 lb 9.6 oz)   10/05/23 107 kg (236 lb 6.4 oz)   09/11/23 107 kg (236 lb)       Physical Exam    General: NAD  HEENT: NCAT, EOMI, normal conjunctiva  Cardiovascular: RRR, normal S1 and S2, no m/r/g  Pulmonary: Normal respiratory effort, no wheezes, rales or rhonchi  Extremities: No lower extremity edema  Skin: Normal skin color, no rashes     Grover Roberts MD

## 2023-12-15 NOTE — PATIENT INSTRUCTIONS
-You will be started on a course of Augmentin for a sinus infection  -Please restart your Nasonex and Astelin nasal sprays  -Use the saline nasal spray throughout the day as needed  -Recommend performing daily saline nasal rinses (NeilMed)  -For your earwax use over-the-counter Debrox

## 2023-12-29 ENCOUNTER — OFFICE VISIT (OUTPATIENT)
Dept: INTERNAL MEDICINE CLINIC | Facility: CLINIC | Age: 43
End: 2023-12-29
Payer: COMMERCIAL

## 2023-12-29 VITALS
HEART RATE: 75 BPM | SYSTOLIC BLOOD PRESSURE: 120 MMHG | WEIGHT: 239.4 LBS | DIASTOLIC BLOOD PRESSURE: 78 MMHG | OXYGEN SATURATION: 99 % | BODY MASS INDEX: 33.39 KG/M2

## 2023-12-29 DIAGNOSIS — K14.0 TONGUE ULCER: Primary | ICD-10-CM

## 2023-12-29 PROCEDURE — 99213 OFFICE O/P EST LOW 20 MIN: CPT | Performed by: INTERNAL MEDICINE

## 2023-12-29 RX ORDER — DIPHENHYDRAMINE HYDROCHLORIDE AND LIDOCAINE HYDROCHLORIDE AND ALUMINUM HYDROXIDE AND MAGNESIUM HYDRO
10 KIT EVERY 4 HOURS PRN
Qty: 119 ML | Refills: 0 | Status: SHIPPED | OUTPATIENT
Start: 2023-12-29

## 2023-12-29 RX ORDER — VALACYCLOVIR HYDROCHLORIDE 1 G/1
2000 TABLET, FILM COATED ORAL 2 TIMES DAILY
Qty: 4 TABLET | Refills: 0 | Status: SHIPPED | OUTPATIENT
Start: 2023-12-29 | End: 2023-12-30

## 2023-12-29 NOTE — PROGRESS NOTES
Name: Sylvain Choi      : 1980      MRN: [id number]  Encounter Provider: Grover Harden MD  Encounter Date: 2023   Enc[facility] department: [facility]    Assessment & Plan     1. Tongue ulcer  -     valACYclovir (VALTREX) 1,000 mg tablet; Take 2 tablets (2,000 mg total) by mouth 2 (two) times a day for 1 day  -     diphenhydramine, lidocaine, Al/Mg hydroxide, simethicone (Magic Mouthwash) SUSP; Swish and spit 10 mL every 4 (four) hours as needed for mouth pain or discomfort    -Ulcerations potentially due to a virus versus traumatic injury given reported accidental tongue biting.  -A prescription has been sent to the patient's pharmacy for Valtrex.  -He has also been given a prescription for Magic mouthwash to use as needed.  -Advised patient to avoid spicy foods and acidic foods while the ulcers heal.       Subjective      HPI  Patient presents today as an acute visit.  He complains of 3 small ulcers on the bottom left portion of his tongue.  He reports he first noticed them 4 days ago.  He believes he may have bitten his tongue prior to the ulcers appearing during the holiday.  He notes around that time he was also eating a fair amount of tomato based sauce which further aggravated his symptoms.    All other systems negative except for pertinent findings noted in HPI.       Current Outpatient Medications on File Prior to Visit   Medication Sig   • azelastine (ASTELIN) 0.1 % nasal spray 2 sprays into each nostril daily Use in each nostril as directed   • mometasone (NASONEX) 50 mcg/act nasal spray 2 sprays into each nostril daily   • famotidine (PEPCID) 20 mg tablet Take 1 tablet (20 mg total) by mouth 2 (two) times a day (Patient not taking: Reported on 10/5/2023)       Objective     /78 (BP Location: Left arm, Patient Position: Sitting, Cuff Size: Standard)   Pulse 75   Wt 109 kg (239 lb 6.4 oz)   SpO2 99%   BMI 33.39 kg/m²     BP Readings from Last 3  Encounters:   12/29/23 120/78   12/15/23 112/72   10/05/23 110/70        Wt Readings from Last 3 Encounters:   12/29/23 109 kg (239 lb 6.4 oz)   12/15/23 109 kg (240 lb 9.6 oz)   10/05/23 107 kg (236 lb 6.4 oz)       Physical Exam    General: NAD  HEENT: NCAT, EOMI, normal conjunctiva, 3 distinct ulcerations on the left lateral underside of the tongue  Cardiovascular: RRR, normal S1 and S2, no m/r/g  Pulmonary: Normal respiratory effort, no wheezes, rales or rhonchi  Extremities: No lower extremity edema  Skin: Normal skin color, no rashes     Grover Harden MD

## 2023-12-29 NOTE — PATIENT INSTRUCTIONS
-An antiviral medication called Valtrex has been sent to your pharmacy.  -You will also be given a prescription for a Magic mouthwash to use as needed for mouth discomfort.  If this is unavailable you may purchase over-the-counter Orajel.  -While your tongue ulcer is healing avoid any spicy or acidic foods.

## 2024-04-09 ENCOUNTER — OFFICE VISIT (OUTPATIENT)
Dept: INTERNAL MEDICINE CLINIC | Facility: CLINIC | Age: 44
End: 2024-04-09
Payer: COMMERCIAL

## 2024-04-09 VITALS
OXYGEN SATURATION: 99 % | BODY MASS INDEX: 32.86 KG/M2 | WEIGHT: 242.6 LBS | DIASTOLIC BLOOD PRESSURE: 78 MMHG | HEIGHT: 72 IN | SYSTOLIC BLOOD PRESSURE: 112 MMHG | HEART RATE: 74 BPM

## 2024-04-09 DIAGNOSIS — R06.83 SNORING: ICD-10-CM

## 2024-04-09 DIAGNOSIS — Z00.00 LABORATORY EXAM ORDERED AS PART OF ROUTINE GENERAL MEDICAL EXAMINATION: ICD-10-CM

## 2024-04-09 DIAGNOSIS — L64.9 ANDROGENIC ALOPECIA: ICD-10-CM

## 2024-04-09 DIAGNOSIS — Z13.220 SCREENING, LIPID: ICD-10-CM

## 2024-04-09 DIAGNOSIS — Z12.11 COLON CANCER SCREENING: ICD-10-CM

## 2024-04-09 DIAGNOSIS — Z00.00 ANNUAL PHYSICAL EXAM: Primary | ICD-10-CM

## 2024-04-09 PROCEDURE — 99396 PREV VISIT EST AGE 40-64: CPT | Performed by: INTERNAL MEDICINE

## 2024-04-09 NOTE — PROGRESS NOTES
ADULT ANNUAL PHYSICAL  [facility]    NAME: Sylvain Choi  AGE: 43 y.o. SEX: male  : 1980     DATE: 2024     Assessment and Plan:     Problem List Items Addressed This Visit    None  Visit Diagnoses     Annual physical exam    -  Primary    Colon cancer screening        Relevant Orders    Ambulatory Referral to Gastroenterology    Snoring        Relevant Orders    Ambulatory Referral to Sleep Medicine    Screening, lipid        Relevant Orders    Lipid panel    Laboratory exam ordered as part of routine general medical examination        Relevant Orders    CBC and differential    Comprehensive metabolic panel    Androgenic alopecia            Immunizations and preventive care screenings were discussed with patient today. Appropriate education was printed on patient's after visit summary.    Counseling:  Exercise: the importance of regular exercise/physical activity was discussed. Recommend exercise 3-5 times per week for at least 30 minutes.   Try OTC topical minoxidil for hair loss         Return in about 1 year (around 2025) for Annual physical.     Chief Complaint:     Chief Complaint   Patient presents with   • Annual Exam      History of Present Illness:     Adult Annual Physical   Patient here for a comprehensive physical exam. The patient reports no problems.    Diet and Physical Activity  Diet/Nutrition:generally healthy, needs to avoid fast food  Exercise: no formal exercise.      Depression Screening  PHQ-2/9 Depression Screening    Little interest or pleasure in doing things: 0 - not at all  Feeling down, depressed, or hopeless: 0 - not at all  PHQ-2 Score: 0  PHQ-2 Interpretation: Negative depression screen       General Health  Sleep: sleeps poorly, gets 4-6 hours of sleep on average, snores loudly, experiences daytime hypersomnolence, and unrefreshing sleep.   Hearing: normal - bilateral.  Vision: most recent eye exam >1 year  ago and wears glasses.   Dental: regular dental visits.        Health  Symptoms include: none       Review of Systems:     Review of Systems   Constitutional:  Positive for fatigue. Negative for unexpected weight change.   Respiratory:  Negative for shortness of breath.    Cardiovascular:  Negative for chest pain and palpitations.   Gastrointestinal:  Negative for abdominal pain, constipation and diarrhea.   Genitourinary:  Negative for difficulty urinating.   Skin:         Receding hairline   Neurological:  Positive for headaches (brief mild episodes). Negative for dizziness.      Past Medical History:     Past Medical History:   Diagnosis Date   • Allergic     Dust, pollen   • GERD (gastroesophageal reflux disease)    • Nasal congestion    • Weight gain    • Wellness examination 06/10/2020      Past Surgical History:     Past Surgical History:   Procedure Laterality Date   • WISDOM TOOTH EXTRACTION        Family History:     Family History   Problem Relation Age of Onset   • Colon cancer Mother         Womanly parts in uterus region removed due to cancer.   • Colon cancer Maternal Grandmother    • Lymphoma Maternal Grandmother    • Uterine cancer Maternal Grandmother    • Cancer Paternal Grandmother    • No Known Problems Son    • No Known Problems Daughter       Social History:     Social History     Socioeconomic History   • Marital status: /Civil Union     Spouse name: Mikayla   • Number of children: 2   • Years of education: None   • Highest education level: None   Occupational History     Employer: [employer]   Tobacco Use   • Smoking status: Never   • Smokeless tobacco: Never   Vaping Use   • Vaping status: Never Used   Substance and Sexual Activity   • Alcohol use: Yes     Alcohol/week: 1.0 standard drink of alcohol     Types: 1 Cans of beer per week     Comment: Social   • Drug use: Never   • Sexual activity: Not Currently     Partners: Female     Birth control/protection: Abstinence,  "Condom Male   Other Topics Concern   • None   Social History Narrative    Lives with family            Who lives in your home: Wife and children     What type of home do you live in: Single house    Age of your home: Built in the 50's     How long have you been living there: 9 yrs     Type of heat: Radiator    Type of fuel: Oil    What type of azalea is in your bedroom: Hardwood floor    Do you have the following in or near your home:    Air products: Central air    Pests: None    Pets: None    Are pets allowed in bedroom: N/A    Open fields, wooded areas nearby: N/A    Basement: Dry, Finished, and Unfinished    Exposure to second hand smoke: No        Habits:    Caffeine: coffee 1 large cup am-soda 1 20 oz daily-    Chocolate: occasionally     Other:              Social Determinants of Health     Financial Resource Strain: Not on file   Food Insecurity: Not on file   Transportation Needs: Not on file   Physical Activity: Inactive (9/11/2023)    Exercise Vital Sign    • Days of Exercise per Week: 0 days    • Minutes of Exercise per Session: 0 min   Stress: Not on file   Social Connections: Not on file   Intimate Partner Violence: Not on file   Housing Stability: Not on file      Current Medications:     Current Outpatient Medications   Medication Sig Dispense Refill   • azelastine (ASTELIN) 0.1 % nasal spray 2 sprays into each nostril daily Use in each nostril as directed 30 mL 11   • mometasone (NASONEX) 50 mcg/act nasal spray 2 sprays into each nostril daily 17 g 11     No current facility-administered medications for this visit.      Allergies:     Allergies   Allergen Reactions   • Dairy Aid  [Tilactase]      Other reaction(s): GI Upset  Annotation - 10Aug2015: Milk      Physical Exam:     /78 (BP Location: Left arm, Patient Position: Sitting, Cuff Size: Standard)   Pulse 74   Ht 5' 11.65\" (1.82 m)   Wt 110 kg (242 lb 9.6 oz)   SpO2 99%   BMI 33.22 kg/m²     Physical Exam  Vitals and nursing note " reviewed.   Constitutional:       General: He is not in acute distress.     Appearance: He is well-developed. He is not ill-appearing, toxic-appearing or diaphoretic.   HENT:      Head:      Comments: Moderate cerumen AU     Right Ear: Tympanic membrane and ear canal normal.      Left Ear: Tympanic membrane and ear canal normal.   Eyes:      Conjunctiva/sclera: Conjunctivae normal.   Cardiovascular:      Rate and Rhythm: Normal rate and regular rhythm.      Heart sounds: No murmur heard.  Pulmonary:      Effort: Pulmonary effort is normal. No respiratory distress.      Breath sounds: Normal breath sounds.   Abdominal:      Palpations: Abdomen is soft.      Tenderness: There is no abdominal tenderness.   Musculoskeletal:      Cervical back: Neck supple.      Right lower leg: No edema.      Left lower leg: No edema.   Neurological:      Mental Status: He is alert.   Psychiatric:         Mood and Affect: Mood normal.         Behavior: Behavior normal.          Lea Reyes, MD  [facility]

## 2024-04-10 DIAGNOSIS — R06.83 SNORING: Primary | ICD-10-CM

## 2024-09-23 ENCOUNTER — TELEPHONE (OUTPATIENT)
Age: 44
End: 2024-09-23

## 2024-09-23 ENCOUNTER — PREP FOR PROCEDURE (OUTPATIENT)
Age: 44
End: 2024-09-23

## 2024-09-23 DIAGNOSIS — Z12.11 SCREENING FOR COLON CANCER: Primary | ICD-10-CM

## 2024-09-23 NOTE — LETTER
September 23, 2024         COLONOSCOPY  MIRALAX/Dulcolax Bowel Preparation Instructions    The OR/GI Lab will contact you the evening prior to your procedure with your exact arrival time.    Our practice requires a 1 week notice for any cancellations or rescheduling. We kindly ask that you immediately notify us of any changes including any new medications that are prescribed. Thank you for your cooperation.     WEEK BEFORE YOUR PROCEDURE:  Stop taking Iron tablets.  5 days prior, AVOID vegetables and fruits with skins or seeds, nuts, corn, popcorn and whole grain breads.   Purchase: One (1) 238-gram container of Miralax (polyethylene glycol 3350), four (4) 5 mg Dulcolax (bisacodyl) tablets, and one (1) 64-ounce bottle of Gatorade (sports drink) - no red, orange, or purple. These may be purchased at any pharmacy without a prescription. Generic products are permissible.   Arrange responsible transportation for day of the procedure.     DAY BEFORE THE PROCEDURE:   CLEAR liquids only for entire day prior. Nothing red, orange or purple.    You MAY have:                                                               Soda  Water  Broth Gatorade  Jello  Popsicles Coffee/tea without milk/creamer     YOU MAY NOT HAVE:  Solid foods   Milk and milk products    Juice with pulp    BOWEL PREPARATION:  Includes: One (1) 238-gram container of Miralax (polyethylene glycol 3350), four (4) 5 mg Dulcolax (bisacodyl) tablets, and one (1) 64-ounce bottle of Gatorade (sports drink).  Preparation may be refrigerated.  Entire bowel prep should be completed.     Afternoon before the procedure (2:00 pm - 5:00 pm):    Take two (2) 5 mg Dulcolax laxative tablets.     Evening before the procedure (6:00 pm):  Mix entire container of Miralax with one (1) 64-ounce bottle of Gatorade and shake until all medication is dissolved.   Begin drinking solution. Drink an eight (8) ounce cup every 10-15 minutes until you have consumed half (32 ounces) of the  solution.  Refrigerate remaining solution.    Night before the procedure (8:00 pm):  Take two (2) 5 mg Dulcolax laxative tablets.     Beginning 5 hours before your procedure:  Drink the remaining amount of prepared solution (32 ounces).  Drink an eight (8) ounce cup every 10-15 minutes until you have consumed the remaining solution.     Bowel prep should be completed 4 hours prior to procedure time.    NOTHING TO EAT OR DRINK AFTER MIDNIGHT- EXCEPT FOR YOUR PREP    DAY OF THE PROCEDURE:  You may brush your teeth.  Leave all jewelry at home.  Please arrive for your procedure as indicated by the OR / GI Lab / Endoscopy Unit. The hospital will contact you the day before with your exact arrival time.   Make sure you have arranged ahead of time for a responsible adult (18 or older) to accompany and drive you home after the procedure.  Please discuss any transportation concerns with our staff prior to your procedure.    The effects of the anesthesia can persist for 24 hours.  After receiving the sedation, you must exercise caution before engaging in any activity that could harm yourself and others (such as driving a car).  Do not make any important decisions or do not drink any alcoholic beverages during this time period.  After your procedure, you may have anything you'd like to eat or drink.  You will probably want to start with something light.  Please include plenty of fluids.  Avoid items that cause gas such as sodas and salads.    SPECIAL INSTRUCTIONS:    For patients currently taking blood thinners and/or antiplatelet therapy our office will contact the prescribing provider.  Our office will contact you with any required changes to your medication regimen.     Blood thinner (i.e. - Coumadin, Pradaxa, Lovenox, Xarelto, Eliquis)  ?  Continue (Do Not Stop)  ? Stop______________for_____________days prior to the procedure.    Antiplatelet (i.e. - Plavix, Aggrenox, Effient, Brilinta)  ?  Continue (Do Not  Stop)  ? Stop______________for_____________days prior to the procedure.       Diabetes:   If you are Diabetic, please see separate Diabetic Instruction Sheet.          Prescribed medications:  Do not stop your aspirin, or any of your other medications (unless instructed otherwise).    Take the rest of your prescribed medications with small sips of water at least 2 hours prior to your procedure.      For any questions or concerns related to your bowel preparation or pre-procedure instructions, please contact our office at [phone].  Thank you for choosing [facility]!

## 2024-09-23 NOTE — TELEPHONE ENCOUNTER
Scheduled date of colonoscopy (as of today): 12/13/24  Physician performing colonoscopy: POOJA  Location of colonoscopy: [facility]  Bowel prep reviewed with patient: elaine/duane  Instructions to [facility] and confirmed email       09/23/24  Screened by: Annalisa Pedraza MA    Referring Provider     Pre- Screening:     There is no height or weight on file to calculate BMI. 33.22  Has patient been referred for a routine screening Colonoscopy? yes  Is the patient between 45-75 years old? yes      Previous Colonoscopy no   If yes:    Date:     Facility:     Reason:     Does the patient want to see a Gastroenterologist prior to their procedure OR are they having any GI symptoms? no    Has the patient been hospitalized or had abdominal surgery in the past 6 months? no    Does the patient use supplemental oxygen? no    Does the patient take Coumadin, Lovenox, Plavix, Elliquis, Xarelto, or other blood thinning medication? no    Has the patient had a stroke, cardiac event, or stent placed in the past year? no      If patient is between 45yrs - 49yrs, please advise patient that we will have to confirm benefits & coverage with their insurance company for a routine screening colonoscopy.

## 2024-09-23 NOTE — TELEPHONE ENCOUNTER
Patient called in for gastroenterology phone number. Gave patient phone number [phone].  NFA needed at this time

## 2024-10-05 ENCOUNTER — IMMUNIZATIONS (OUTPATIENT)
Dept: INTERNAL MEDICINE CLINIC | Facility: CLINIC | Age: 44
End: 2024-10-05
Payer: COMMERCIAL

## 2024-10-05 DIAGNOSIS — Z23 ENCOUNTER FOR IMMUNIZATION: Primary | ICD-10-CM

## 2024-10-05 PROCEDURE — 90656 IIV3 VACC NO PRSV 0.5 ML IM: CPT

## 2024-10-05 PROCEDURE — 90471 IMMUNIZATION ADMIN: CPT

## 2024-11-05 ENCOUNTER — HOSPITAL ENCOUNTER (OUTPATIENT)
Dept: RADIOLOGY | Facility: HOSPITAL | Age: 44
Discharge: HOME | End: 2024-11-05
Payer: COMMERCIAL

## 2024-11-05 ENCOUNTER — OFFICE VISIT (OUTPATIENT)
Dept: INTERNAL MEDICINE CLINIC | Facility: CLINIC | Age: 44
End: 2024-11-05
Payer: COMMERCIAL

## 2024-11-05 VITALS
DIASTOLIC BLOOD PRESSURE: 74 MMHG | BODY MASS INDEX: 32.18 KG/M2 | OXYGEN SATURATION: 99 % | HEIGHT: 72 IN | SYSTOLIC BLOOD PRESSURE: 118 MMHG | WEIGHT: 237.6 LBS | HEART RATE: 68 BPM | RESPIRATION RATE: 16 BRPM

## 2024-11-05 DIAGNOSIS — M25.512 ACUTE PAIN OF LEFT SHOULDER: ICD-10-CM

## 2024-11-05 DIAGNOSIS — M25.512 ACUTE PAIN OF LEFT SHOULDER: Primary | ICD-10-CM

## 2024-11-05 PROCEDURE — 99213 OFFICE O/P EST LOW 20 MIN: CPT | Performed by: INTERNAL MEDICINE

## 2024-11-05 PROCEDURE — 73030 X-RAY EXAM OF SHOULDER: CPT

## 2024-11-05 NOTE — PROGRESS NOTES
"Name: Sylvain Choi      : 1980      MRN: [id number]  Encounter Provider: Grover Harden MD  Encounter Date: 2024   Encounter department: [facility]    Assessment & Plan     1. Acute pain of left shoulder  -     XR shoulder 2+ vw left; Future; Expected date: 2024  -     Ambulatory Referral to Orthopedic Surgery; Future  -An x-ray of the shoulder has been ordered to rule out any fracture.  Suspect his shoulder weakness may be secondary to rotator cuff pathology such as tear.  If x-ray imaging is normal and MRI will be ordered.  Patient has been referred to orthopedic surgery for further evaluation.       Subjective      HPI  Patient presents today as an acute visit.  He reports he fell on his left shoulder on 10/25 and since then experiences pain whenever lifting his arm or with overhead movements.  He states he was running on a sidewalk when he tripped on an area of uneven pavement.  He reports he put out his arms to catch himself.  Initially he states he experienced left shoulder pain but was able to lift his arm at that time.  Over the subsequent days he reports he has been unable to lift his arm due to shoulder weakness.  For pain he has been using over-the-counter IcyHot as needed.    All other systems negative except for pertinent findings noted in HPI.       Current Outpatient Medications on File Prior to Visit   Medication Sig    azelastine (ASTELIN) 0.1 % nasal spray 2 sprays into each nostril daily Use in each nostril as directed    mometasone (NASONEX) 50 mcg/act nasal spray 2 sprays into each nostril daily       Objective     /74   Pulse 68   Resp 16   Ht 5' 11.65\" (1.82 m)   Wt 108 kg (237 lb 9.6 oz)   SpO2 99%   BMI 32.54 kg/m²     BP Readings from Last 3 Encounters:   24 118/74   24 112/78   23 120/78        Wt Readings from Last 3 Encounters:   24 108 kg (237 lb 9.6 oz)   24 110 kg (242 lb 9.6 oz)   23 " 109 kg (239 lb 6.4 oz)       Physical Exam    General: NAD  HEENT: NCAT, EOMI, normal conjunctiva  Cardiovascular: RRR, normal S1 and S2, no m/r/g  Pulmonary: Normal respiratory effort, no wheezes, rales or rhonchi  MSK: Normal bulk and tone, left shoulder abduction limited to 90 degrees, shoulder flexion limited to 30 degrees, tenderness to palpation over left AC joint  Skin: Normal skin color, no rashes     Grover Harden MD

## 2024-11-07 DIAGNOSIS — M25.512 ACUTE PAIN OF LEFT SHOULDER: ICD-10-CM

## 2024-11-07 DIAGNOSIS — M10.112: ICD-10-CM

## 2024-11-07 DIAGNOSIS — G89.11 ACUTE PAIN OF LEFT SHOULDER DUE TO TRAUMA: Primary | ICD-10-CM

## 2024-11-07 DIAGNOSIS — R29.898 WEAKNESS OF LEFT SHOULDER: ICD-10-CM

## 2024-11-07 DIAGNOSIS — M25.512 ACUTE PAIN OF LEFT SHOULDER DUE TO TRAUMA: Primary | ICD-10-CM

## 2024-11-07 DIAGNOSIS — T56.0X1A: ICD-10-CM

## 2024-11-11 ENCOUNTER — OFFICE VISIT (OUTPATIENT)
Dept: OBGYN CLINIC | Facility: CLINIC | Age: 44
End: 2024-11-11
Payer: COMMERCIAL

## 2024-11-11 VITALS
HEIGHT: 72 IN | WEIGHT: 237 LBS | BODY MASS INDEX: 32.1 KG/M2 | SYSTOLIC BLOOD PRESSURE: 110 MMHG | HEART RATE: 73 BPM | DIASTOLIC BLOOD PRESSURE: 71 MMHG

## 2024-11-11 DIAGNOSIS — M25.512 ACUTE PAIN OF LEFT SHOULDER: Primary | ICD-10-CM

## 2024-11-11 PROCEDURE — 99204 OFFICE O/P NEW MOD 45 MIN: CPT | Performed by: ORTHOPAEDIC SURGERY

## 2024-11-11 NOTE — PROGRESS NOTES
CHIEF COMPLAIN/REASON FOR VISIT  Chief Complaint   Patient presents with    Left Shoulder - Pain       HISTORY OF PRESENT ILLNESS  Sylvain Choi is a RHD 44 y.o. male who presents for evaluation of their left shoulder. Patient said two weeks ago he fell and tried to catch himself with his left arm injuring it. Patient said since the injury he has difficulty with range of motion of strength with the shoulder. Patient describes a constant pain in the shoulder since the injury. He feels the pain is worse in the morning. Prior to the injury, patient denies difficulty with range of motion, weakness, and pain.    REVIEW OF SYSTEMS  Review of systems was performed and, woutside that mentioned in the HPI, it was negative for symptomology related to the integumentary, hematologic, immunologic, allergic, neurologic, cardiovascular, respiratory, GI or  systems.     MEDICAL HISTORY  Patient Active Problem List   Diagnosis    L4-L5 disc bulge    Eczema    Facet degeneration of lumbar region    Low back pain with sciatica       SURGICAL HISTORY  Past Surgical History:   Procedure Laterality Date    WISDOM TOOTH EXTRACTION         CURRENT MEDICATIONS    Current Outpatient Medications:     azelastine (ASTELIN) 0.1 % nasal spray, 2 sprays into each nostril daily Use in each nostril as directed, Disp: 30 mL, Rfl: 11    mometasone (NASONEX) 50 mcg/act nasal spray, 2 sprays into each nostril daily, Disp: 17 g, Rfl: 11    SOCIAL HISTORY  Social History     Socioeconomic History    Marital status: /Civil Union     Spouse name: Mikayla    Number of children: 2    Years of education: Not on file    Highest education level: Not on file   Occupational History     Employer: [employer]   Tobacco Use    Smoking status: Never    Smokeless tobacco: Never   Vaping Use    Vaping status: Never Used   Substance and Sexual Activity    Alcohol use: Yes     Alcohol/week: 1.0 standard drink of alcohol     Types: 1 Cans of beer per  week     Comment: Social    Drug use: Never    Sexual activity: Not Currently     Partners: Female     Birth control/protection: Abstinence, Condom Male   Other Topics Concern    Not on file   Social History Narrative    Lives with family            Who lives in your home: Wife and children     What type of home do you live in: Single house    Age of your home: Built in the 50's     How long have you been living there: 9 yrs     Type of heat: Radiator    Type of fuel: Oil    What type of azalea is in your bedroom: Hardwood floor    Do you have the following in or near your home:    Air products: Central air    Pests: None    Pets: None    Are pets allowed in bedroom: N/A    Open fields, wooded areas nearby: N/A    Basement: Dry, Finished, and Unfinished    Exposure to second hand smoke: No        Habits:    Caffeine: coffee 1 large cup am-soda 1 20 oz daily-    Chocolate: occasionally     Other:              Social Determinants of Health     Financial Resource Strain: Not on file   Food Insecurity: Not on file   Transportation Needs: Not on file   Physical Activity: Inactive (9/11/2023)    Exercise Vital Sign     Days of Exercise per Week: 0 days     Minutes of Exercise per Session: 0 min   Stress: Not on file   Social Connections: Not on file   Intimate Partner Violence: Not on file   Housing Stability: Not on file       Objective     VITAL SIGNS  There were no vitals taken for this visit.     PHYSICAL EXAM  General:   Well-appearing  No acute distress  Appears stated age    Left Shoulder  Positive tenderness to palpation over the acromioclavicular joint  Negative tenderness to palpation over the long head of the biceps tendon  Shoulder effusion none present  Shoulder abduction 45 / 180 (passively up to 120)  Shoulder forward flexion 45 / 180 (passively up to 120)  Shoulder internal rotation T10  Supraspinatus/ABD 3/5   Infraspinatus/ER 4/5   Negative Belly Press/SS  Positive Neer  Positive Mcguire  Positive  O'briens  Skin is intact with no erythema, warmth or drainage  Motor strength intact distally  Sensation to light touch is normal in the axillary, radial, ulnar, and median nerve distributions.  Fingers warm and perfused    RADIOGRAPHIC EXAMINATION/DIAGNOSTICS:  Independent interpretation of the left shoulder bitching x-rays taken today demonstrates no obvious acute osseous abnormalities, soft tissues unremarkable    ASSESSMENT/PLAN:  Left shoulder pain; r/o rotator cuff tear    History and clinical exam consistent with concern for soft tissue injury (rotator cuff pathology).      Prior workup of left shoulder pain notes reviewed and are consistent with possible rotator cuff injury.      Plan to obtain MRI of the patient shoulder to further evaluate soft tissue injury.  Depending on shoulder MRI results, patient may be a potential candidate for surgical management.  Patient will follow up to discuss results of the study and treatment plan. Recommend RICE and anti-inflammatories at this time.  They are to call with any other questions or concerns.    Scribe Attestation      I,:  Davin Arias PA-C am acting as a scribe while in the presence of the attending physician.:       I,:  Sylvain Lozano MD personally performed the services described in this documentation    as scribed in my presence.:

## 2024-11-27 ENCOUNTER — HOSPITAL ENCOUNTER (OUTPATIENT)
Dept: RADIOLOGY | Age: 44
Discharge: HOME | End: 2024-11-27
Payer: COMMERCIAL

## 2024-11-27 DIAGNOSIS — M25.512 ACUTE PAIN OF LEFT SHOULDER: ICD-10-CM

## 2024-11-27 PROCEDURE — 73221 MRI JOINT UPR EXTREM W/O DYE: CPT

## 2024-12-02 ENCOUNTER — PATIENT MESSAGE (OUTPATIENT)
Dept: GASTROENTEROLOGY | Facility: CLINIC | Age: 44
End: 2024-12-02

## 2024-12-02 ENCOUNTER — APPOINTMENT (OUTPATIENT)
Dept: LAB | Age: 44
End: 2024-12-02
Payer: COMMERCIAL

## 2024-12-02 ENCOUNTER — OFFICE VISIT (OUTPATIENT)
Dept: OBGYN CLINIC | Facility: CLINIC | Age: 44
End: 2024-12-02
Payer: COMMERCIAL

## 2024-12-02 ENCOUNTER — TELEPHONE (OUTPATIENT)
Dept: OBGYN CLINIC | Facility: CLINIC | Age: 44
End: 2024-12-02

## 2024-12-02 VITALS
DIASTOLIC BLOOD PRESSURE: 80 MMHG | WEIGHT: 237 LBS | BODY MASS INDEX: 32.1 KG/M2 | SYSTOLIC BLOOD PRESSURE: 130 MMHG | HEART RATE: 76 BPM | HEIGHT: 72 IN

## 2024-12-02 DIAGNOSIS — S46.012A TRAUMATIC COMPLETE TEAR OF LEFT ROTATOR CUFF, INITIAL ENCOUNTER: ICD-10-CM

## 2024-12-02 DIAGNOSIS — S46.012A TRAUMATIC COMPLETE TEAR OF LEFT ROTATOR CUFF, INITIAL ENCOUNTER: Primary | ICD-10-CM

## 2024-12-02 LAB
ALBUMIN SERPL BCG-MCNC: 4.3 G/DL (ref 3.5–5)
ALP SERPL-CCNC: 67 U/L (ref 34–104)
ALT SERPL W P-5'-P-CCNC: 22 U/L (ref 7–52)
ANION GAP SERPL CALCULATED.3IONS-SCNC: 7 MMOL/L (ref 4–13)
AST SERPL W P-5'-P-CCNC: 15 U/L (ref 13–39)
ATRIAL RATE: 71 BPM
BASOPHILS # BLD AUTO: 0.08 THOUSANDS/ΜL (ref 0–0.1)
BASOPHILS NFR BLD AUTO: 1 % (ref 0–1)
BILIRUB SERPL-MCNC: 0.55 MG/DL (ref 0.2–1)
BUN SERPL-MCNC: 18 MG/DL (ref 5–25)
CALCIUM SERPL-MCNC: 9 MG/DL (ref 8.4–10.2)
CHLORIDE SERPL-SCNC: 103 MMOL/L (ref 96–108)
CO2 SERPL-SCNC: 29 MMOL/L (ref 21–32)
CREAT SERPL-MCNC: 0.97 MG/DL (ref 0.6–1.3)
EOSINOPHIL # BLD AUTO: 0.33 THOUSAND/ΜL (ref 0–0.61)
EOSINOPHIL NFR BLD AUTO: 4 % (ref 0–6)
ERYTHROCYTE [DISTWIDTH] IN BLOOD BY AUTOMATED COUNT: 13.2 % (ref 11.6–15.1)
GFR SERPL CREATININE-BSD FRML MDRD: 93 ML/MIN/1.73SQ M
GLUCOSE P FAST SERPL-MCNC: 84 MG/DL (ref 65–99)
HCT VFR BLD AUTO: 43.9 % (ref 36.5–49.3)
HGB BLD-MCNC: 14.3 G/DL (ref 12–17)
IMM GRANULOCYTES # BLD AUTO: 0.03 THOUSAND/UL (ref 0–0.2)
IMM GRANULOCYTES NFR BLD AUTO: 0 % (ref 0–2)
LYMPHOCYTES # BLD AUTO: 1.73 THOUSANDS/ΜL (ref 0.6–4.47)
LYMPHOCYTES NFR BLD AUTO: 19 % (ref 14–44)
MCH RBC QN AUTO: 29.1 PG (ref 26.8–34.3)
MCHC RBC AUTO-ENTMCNC: 32.6 G/DL (ref 31.4–37.4)
MCV RBC AUTO: 89 FL (ref 82–98)
MONOCYTES # BLD AUTO: 0.72 THOUSAND/ΜL (ref 0.17–1.22)
MONOCYTES NFR BLD AUTO: 8 % (ref 4–12)
NEUTROPHILS # BLD AUTO: 6.36 THOUSANDS/ΜL (ref 1.85–7.62)
NEUTS SEG NFR BLD AUTO: 68 % (ref 43–75)
NRBC BLD AUTO-RTO: 0 /100 WBCS
P AXIS: 35 DEGREES
PLATELET # BLD AUTO: 255 THOUSANDS/UL (ref 149–390)
PMV BLD AUTO: 10.6 FL (ref 8.9–12.7)
POTASSIUM SERPL-SCNC: 3.9 MMOL/L (ref 3.5–5.3)
PR INTERVAL: 142 MS
PROT SERPL-MCNC: 7.6 G/DL (ref 6.4–8.4)
QRS AXIS: 19 DEGREES
QRSD INTERVAL: 84 MS
QT INTERVAL: 408 MS
QTC INTERVAL: 444 MS
RBC # BLD AUTO: 4.92 MILLION/UL (ref 3.88–5.62)
SODIUM SERPL-SCNC: 139 MMOL/L (ref 135–147)
T WAVE AXIS: 8 DEGREES
VENTRICULAR RATE: 71 BPM
WBC # BLD AUTO: 9.25 THOUSAND/UL (ref 4.31–10.16)

## 2024-12-02 PROCEDURE — 93010 ELECTROCARDIOGRAM REPORT: CPT | Performed by: INTERNAL MEDICINE

## 2024-12-02 PROCEDURE — 99215 OFFICE O/P EST HI 40 MIN: CPT | Performed by: ORTHOPAEDIC SURGERY

## 2024-12-02 PROCEDURE — 80053 COMPREHEN METABOLIC PANEL: CPT

## 2024-12-02 PROCEDURE — 36415 COLL VENOUS BLD VENIPUNCTURE: CPT

## 2024-12-02 PROCEDURE — 85025 COMPLETE CBC W/AUTO DIFF WBC: CPT

## 2024-12-02 RX ORDER — CHLORHEXIDINE GLUCONATE ORAL RINSE 1.2 MG/ML
15 SOLUTION DENTAL ONCE
OUTPATIENT
Start: 2024-12-02 | End: 2024-12-02

## 2024-12-02 NOTE — H&P (VIEW-ONLY)
CHIEF COMPLAINT / REASON FOR VISIT  Sylvain Choi is a 44 y.o. who is following up today to discuss the results of his recent imaging study.    HISTORY OF PRESENT ILLNESS  Patient reports they are doing about the same from when we last saw them. Patient feels his symptoms are disabling and affecting his activities of daily living.    OBJECTIVE  General:   Well-appearing  No acute distress  Appears stated age     Left Shoulder  Positive tenderness to palpation over the acromioclavicular joint  Negative tenderness to palpation over the long head of the biceps tendon  Shoulder effusion none present  Shoulder abduction 45 / 180 (passively up to 120)  Shoulder forward flexion 45 / 180 (passively up to 120)  Shoulder internal rotation T10  Supraspinatus/ABD 3/5   Infraspinatus/ER 4/5   Negative Belly Press/SS  Positive Neer  Positive Mcguire  Positive O'briens  Skin is intact with no erythema, warmth or drainage  Motor strength intact distally  Sensation to light touch is normal in the axillary, radial, ulnar, and median nerve distributions.  Fingers warm and perfused    DIAGNOSTIC IMAGING:  Independent interpretation of left shoulder MRI appears to show full thickness tearing of supraspinatus, bicipital tendonitis    Procedure: XR shoulder 2+ vw left  Result Date: 11/5/2024  Narrative: LEFT SHOULDER INDICATION:   Pain in left shoulder. COMPARISON:  None. VIEWS:  XR SHOULDER 2+ VW LEFT FINDINGS: There is no acute fracture or dislocation. No significant degenerative changes. No lytic or blastic osseous lesion. Soft tissues are unremarkable.     Impression: No acute osseous abnormality. Electronically signed: 11/05/2024 05:16 PM Patricio Barger MD      ASSESSMENT/PLAN:  1. Left shoulder full thickness rotator cuff tear    Sylvain Choi injured their shoulder acutely, resulting in a traumatic rotator cuff tear. We discussed the importance of the rotator cuff in the stability and strength of the shoulder and other  overhead activities. We had an extensive discussion regarding the diagnosis and its natural history. We also discussed both non-operative and operative treatment strategies. In an active individual who is frequently using the upper extremity for work and other activities, I would recommend rotator cuff repair. he as continued to experience significant symptoms and dysfunction and is ready to proceed with surgery. I certainly understand and am in agreement.    Pending medical clearance, we will plan to proceed with shoulder arthroscopy with rotator cuff debridement versus repair, possible biceps tenodesis, subacromial decompression,  and all other indicated procedures. We described the significant post-operative recovery, including a minimum of 6 to 9 months return to full strenuous upper extremity activity if everything goes well. We discussed the anticipated pre, intra, and postoperative course in great detail. Specifically, we discussed the recovery and rehabilitation protocol and time lines.There is certainly a risk of reinjury upon returning full strenuous activities.    We discussed risks of surgery, which include shoulder stiffness, infection, risk of reinjury and future arthritis.    Patient will schedule rotator cuff repair surgery. he will need a preoperative clearance/physical appointment and physical therapy.     Scribe Attestation      I,:  Davin Arias PA-C am acting as a scribe while in the presence of the attending physician.:       I,:  Sylvain Lozano MD personally performed the services described in this documentation    as scribed in my presence.:

## 2024-12-02 NOTE — TELEPHONE ENCOUNTER
Patient called asking if Dr. Lozano has OR time between [place] and [place]? I told him Yes, 12/26 and 12/31. Patient will discuss with his wife and call me back regarding possibly rescheduling surgery.

## 2024-12-04 ENCOUNTER — ANESTHESIA EVENT (OUTPATIENT)
Age: 44
End: 2024-12-04
Payer: COMMERCIAL

## 2024-12-04 ENCOUNTER — TELEPHONE (OUTPATIENT)
Dept: OBGYN CLINIC | Facility: MEDICAL CENTER | Age: 44
End: 2024-12-04

## 2024-12-04 NOTE — TELEPHONE ENCOUNTER
Caller:  Sylvain     Doctor: Dr Lozano    Reason for call: The patient is calling to re-schedule his upcoming surgery with Dr Lozano on 12/18. He was transferred to the Surgery Scheduler line to leave a message.    Thank you     Call back#: [phone]

## 2024-12-04 NOTE — TELEPHONE ENCOUNTER
Spoke with patient. He requested to move surgery to after [place]. Agreed upon 12/31/24 at [facility]. Appointments updated. Questions answered.

## 2024-12-09 ENCOUNTER — CONSULT (OUTPATIENT)
Dept: INTERNAL MEDICINE CLINIC | Facility: CLINIC | Age: 44
End: 2024-12-09
Payer: COMMERCIAL

## 2024-12-09 VITALS
HEIGHT: 71 IN | DIASTOLIC BLOOD PRESSURE: 100 MMHG | SYSTOLIC BLOOD PRESSURE: 130 MMHG | HEART RATE: 83 BPM | WEIGHT: 237 LBS | BODY MASS INDEX: 33.18 KG/M2 | OXYGEN SATURATION: 99 %

## 2024-12-09 DIAGNOSIS — H61.23 BILATERAL IMPACTED CERUMEN: ICD-10-CM

## 2024-12-09 DIAGNOSIS — S46.012A TRAUMATIC COMPLETE TEAR OF LEFT ROTATOR CUFF, INITIAL ENCOUNTER: ICD-10-CM

## 2024-12-09 DIAGNOSIS — Z01.818 PRE-OP EXAMINATION: Primary | ICD-10-CM

## 2024-12-09 PROCEDURE — 99213 OFFICE O/P EST LOW 20 MIN: CPT | Performed by: GENERAL ACUTE CARE HOSPITAL

## 2024-12-09 NOTE — PROGRESS NOTES
Name: Sylvain Choi      : 1980      MRN: [id number]  Encounter Provider: Radha Marquez MD  Encounter Date: 2024   Encounter department: [facility]  :  Assessment & Plan  Traumatic complete tear of left rotator cuff, initial encounter    Orders:    Ambulatory referral to Family Practice    Pre-op examination  Able to complete 4 METS without any symptoms  Patient is medically optimized for his surgery.  No further test needed.  Not on any regular oral prescription medications.       Bilateral impacted cerumen                History of Present Illness     Pre-op Exam    Pre-operative Risk Factors:    History of cerebrovascular disease: No    History of ischemic heart disease: No  Pre-operative treatment with insulin: No  Pre-operative creatinine >2 mg/dL: No    History of congestive heart failure: No    Pre op for rotator cuff surgery on   Dr. Lozano  Doing well in general.  Denies any chest pain shortness of breath palpitation lightheadedness fever.  Able to climb up 1 flight of stairs without any problems.      Review of Systems   Constitutional:  Negative for chills, fatigue and fever.   HENT:  Negative for congestion, nosebleeds, postnasal drip, sneezing, sore throat and trouble swallowing.    Eyes:  Negative for pain.   Respiratory:  Negative for cough, chest tightness, shortness of breath and wheezing.    Cardiovascular:  Negative for chest pain, palpitations and leg swelling.   Gastrointestinal:  Negative for abdominal pain, constipation, diarrhea, nausea and vomiting.   Endocrine: Negative for cold intolerance, heat intolerance, polydipsia and polyuria.   Genitourinary:  Negative for difficulty urinating, dysuria, flank pain and hematuria.   Musculoskeletal:  Negative for arthralgias and myalgias.   Skin:  Negative for rash.   Neurological:  Negative for dizziness, tremors, weakness and headaches.   Hematological:  Does not bruise/bleed easily.   Psychiatric/Behavioral:   "Negative for confusion and dysphoric mood. The patient is not nervous/anxious.           Objective   /100 (BP Location: Left arm, Patient Position: Sitting, Cuff Size: Extra-Large)   Pulse 83   Ht 5' 11\" (1.803 m)   Wt 108 kg (237 lb)   SpO2 99%   BMI 33.05 kg/m²      Physical Exam  Constitutional:       General: He is not in acute distress.     Appearance: He is well-developed.   HENT:      Head: Normocephalic and atraumatic.      Right Ear: External ear normal.      Left Ear: External ear normal.   Eyes:      General: No scleral icterus.     Conjunctiva/sclera: Conjunctivae normal.   Neck:      Thyroid: No thyromegaly.      Trachea: No tracheal deviation.   Cardiovascular:      Rate and Rhythm: Normal rate and regular rhythm.      Heart sounds: Normal heart sounds.   Pulmonary:      Effort: Pulmonary effort is normal. No respiratory distress.      Breath sounds: Normal breath sounds. No wheezing or rales.   Abdominal:      General: Bowel sounds are normal.      Palpations: Abdomen is soft.      Tenderness: There is no abdominal tenderness. There is no guarding or rebound.   Musculoskeletal:      Cervical back: Normal range of motion and neck supple.   Lymphadenopathy:      Cervical: No cervical adenopathy.   Neurological:      Mental Status: He is alert and oriented to person, place, and time.   Psychiatric:         Behavior: Behavior normal.         Thought Content: Thought content normal.         Judgment: Judgment normal.         "

## 2024-12-09 NOTE — ASSESSMENT & PLAN NOTE
Able to complete 4 METS without any symptoms  Patient is medically optimized for his surgery.  No further test needed.  Not on any regular oral prescription medications.

## 2024-12-09 NOTE — ASSESSMENT & PLAN NOTE
Able to complete 4 METS without any symptoms  Patient is medically optimized for his surgery.  No further test needed.

## 2024-12-13 ENCOUNTER — HOSPITAL ENCOUNTER (OUTPATIENT)
Dept: GASTROENTEROLOGY | Facility: AMBULARY SURGERY CENTER | Age: 44
Setting detail: OUTPATIENT SURGERY
End: 2024-12-13
Attending: INTERNAL MEDICINE
Payer: COMMERCIAL

## 2024-12-13 ENCOUNTER — ANESTHESIA (OUTPATIENT)
Dept: GASTROENTEROLOGY | Facility: AMBULARY SURGERY CENTER | Age: 44
End: 2024-12-13
Payer: COMMERCIAL

## 2024-12-13 VITALS
RESPIRATION RATE: 20 BRPM | WEIGHT: 237 LBS | HEIGHT: 70 IN | HEART RATE: 74 BPM | SYSTOLIC BLOOD PRESSURE: 113 MMHG | BODY MASS INDEX: 33.93 KG/M2 | TEMPERATURE: 96.5 F | DIASTOLIC BLOOD PRESSURE: 79 MMHG | OXYGEN SATURATION: 97 %

## 2024-12-13 DIAGNOSIS — Z12.11 SCREENING FOR COLON CANCER: ICD-10-CM

## 2024-12-13 PROCEDURE — 88305 TISSUE EXAM BY PATHOLOGIST: CPT | Performed by: STUDENT IN AN ORGANIZED HEALTH CARE EDUCATION/TRAINING PROGRAM

## 2024-12-13 PROCEDURE — 45385 COLONOSCOPY W/LESION REMOVAL: CPT | Performed by: INTERNAL MEDICINE

## 2024-12-13 RX ORDER — FENTANYL CITRATE 50 UG/ML
INJECTION, SOLUTION INTRAMUSCULAR; INTRAVENOUS AS NEEDED
Status: DISCONTINUED | OUTPATIENT
Start: 2024-12-13 | End: 2024-12-13

## 2024-12-13 RX ORDER — SODIUM CHLORIDE, SODIUM LACTATE, POTASSIUM CHLORIDE, CALCIUM CHLORIDE 600; 310; 30; 20 MG/100ML; MG/100ML; MG/100ML; MG/100ML
INJECTION, SOLUTION INTRAVENOUS CONTINUOUS PRN
Status: DISCONTINUED | OUTPATIENT
Start: 2024-12-13 | End: 2024-12-13

## 2024-12-13 RX ORDER — LIDOCAINE HYDROCHLORIDE 10 MG/ML
INJECTION, SOLUTION EPIDURAL; INFILTRATION; INTRACAUDAL; PERINEURAL AS NEEDED
Status: DISCONTINUED | OUTPATIENT
Start: 2024-12-13 | End: 2024-12-13

## 2024-12-13 RX ORDER — PROPOFOL 10 MG/ML
INJECTION, EMULSION INTRAVENOUS AS NEEDED
Status: DISCONTINUED | OUTPATIENT
Start: 2024-12-13 | End: 2024-12-13

## 2024-12-13 RX ADMIN — LIDOCAINE HYDROCHLORIDE 50 MG: 10 INJECTION, SOLUTION EPIDURAL; INFILTRATION; INTRACAUDAL; PERINEURAL at 12:28

## 2024-12-13 RX ADMIN — PROPOFOL 50 MG: 10 INJECTION, EMULSION INTRAVENOUS at 12:33

## 2024-12-13 RX ADMIN — PROPOFOL 100 MG: 10 INJECTION, EMULSION INTRAVENOUS at 12:29

## 2024-12-13 RX ADMIN — PROPOFOL 50 MG: 10 INJECTION, EMULSION INTRAVENOUS at 12:42

## 2024-12-13 RX ADMIN — FENTANYL CITRATE 50 MCG: 50 INJECTION INTRAMUSCULAR; INTRAVENOUS at 12:43

## 2024-12-13 RX ADMIN — PROPOFOL 50 MG: 10 INJECTION, EMULSION INTRAVENOUS at 12:35

## 2024-12-13 RX ADMIN — PROPOFOL 100 MG: 10 INJECTION, EMULSION INTRAVENOUS at 12:28

## 2024-12-13 RX ADMIN — PROPOFOL 50 MG: 10 INJECTION, EMULSION INTRAVENOUS at 12:30

## 2024-12-13 RX ADMIN — PROPOFOL 50 MG: 10 INJECTION, EMULSION INTRAVENOUS at 12:32

## 2024-12-13 RX ADMIN — SODIUM CHLORIDE, SODIUM LACTATE, POTASSIUM CHLORIDE, AND CALCIUM CHLORIDE: .6; .31; .03; .02 INJECTION, SOLUTION INTRAVENOUS at 12:24

## 2024-12-13 RX ADMIN — FENTANYL CITRATE 25 MCG: 50 INJECTION INTRAMUSCULAR; INTRAVENOUS at 12:33

## 2024-12-13 RX ADMIN — FENTANYL CITRATE 25 MCG: 50 INJECTION INTRAMUSCULAR; INTRAVENOUS at 12:35

## 2024-12-13 NOTE — H&P
"History and Physical -  [facility]  Sylvain Choi 44 y.o. male MRN: [id number]        HPI: Sylvain Choi is a 44 y.o. year old male who presents for colon cancer screening.      REVIEW OF SYSTEMS: Per the HPI, and otherwise unremarkable.    Historical Information   Past Medical History:   Diagnosis Date    Allergic     Dust, pollen    GERD (gastroesophageal reflux disease)     Nasal congestion     Weight gain     Wellness examination 06/10/2020     Past Surgical History:   Procedure Laterality Date    WISDOM TOOTH EXTRACTION       Social History   Social History     Substance and Sexual Activity   Alcohol Use Yes    Alcohol/week: 1.0 standard drink of alcohol    Types: 1 Cans of beer per week    Comment: Social     Social History     Substance and Sexual Activity   Drug Use Never     Social History     Tobacco Use   Smoking Status Never   Smokeless Tobacco Never     Family History   Problem Relation Age of Onset    Colon cancer Mother         Womanly parts in uterus region removed due to cancer.    Colon cancer Maternal Grandmother     Lymphoma Maternal Grandmother     Uterine cancer Maternal Grandmother     Cancer Paternal Grandmother     No Known Problems Son     No Known Problems Daughter        Meds/Allergies       Current Outpatient Medications:     azelastine (ASTELIN) 0.1 % nasal spray    mometasone (NASONEX) 50 mcg/act nasal spray    Allergies   Allergen Reactions    Dairy Aid  [Tilactase]      Other reaction(s): GI Upset  Annotation - 10Aug2015: Milk       Objective     /60   Pulse 74   Temp (!) 96.9 °F (36.1 °C) (Temporal)   Resp 22   Ht 5' 10\" (1.778 m)   Wt 108 kg (237 lb)   SpO2 99%   BMI 34.01 kg/m²       PHYSICAL EXAM    Gen: NAD  Head: NCAT  CV: RRR  CHEST: Clear  ABD: soft, NT/ND  EXT: no edema      ASSESSMENT/PLAN:  This is a 44 y.o. year old male here for colonoscopy, and he is stable and optimized for his procedure.        "

## 2024-12-13 NOTE — ANESTHESIA PREPROCEDURE EVALUATION
Procedure:  COLONOSCOPY    Relevant Problems   MUSCULOSKELETAL   (+) Facet degeneration of lumbar region   (+) Low back pain with sciatica        Physical Exam    Airway    Mallampati score: III         Dental       Cardiovascular      Pulmonary      Other Findings        Anesthesia Plan  ASA Score- 2     Anesthesia Type- IV sedation with anesthesia with ASA Monitors.         Additional Monitors:     Airway Plan:     Comment: IV sedation,  standard ASA monitors. Risks and benefits discussed with patient; patient consented and agrees to proceed.    I saw and evaluated the patient. If seen with CRNA, we have discussed the anesthetic plan and I am in agreement that the plan is appropriate for the patient.  .       Plan Factors-    Chart reviewed.   Existing labs reviewed.                   Induction- intravenous.    Postoperative Plan-         Informed Consent- Anesthetic plan and risks discussed with patient.  I personally reviewed this patient with the CRNA. Discussed and agreed on the Anesthesia Plan with the CRNA..

## 2024-12-13 NOTE — ANESTHESIA POSTPROCEDURE EVALUATION
Post-Op Assessment Note    CV Status:  Stable    Pain management: adequate       Mental Status:  Alert and awake   Hydration Status:  Euvolemic   PONV Controlled:  Controlled   Airway Patency:  Patent     Post Op Vitals Reviewed: Yes    No anethesia notable event occurred.    Staff: CRNA           Last Filed PACU Vitals:  Vitals Value Taken Time   Temp 96.5 °F (35.8 °C) 12/13/24 1250   Pulse 87 12/13/24 1250   BP 96/54 12/13/24 1250   Resp 22 12/13/24 1250   SpO2 96 % 12/13/24 1250       Modified Dilan:  Activity: 2 (12/13/2024 12:52 PM)  Respiration: 2 (12/13/2024 12:52 PM)  Circulation: 2 (12/13/2024 12:52 PM)  Consciousness: 1 (12/13/2024 12:52 PM)  Oxygen Saturation: 2 (12/13/2024 12:52 PM)  Modified Dilan Score: 9 (12/13/2024 12:52 PM)

## 2024-12-16 ENCOUNTER — RESULTS FOLLOW-UP (OUTPATIENT)
Dept: GASTROENTEROLOGY | Facility: CLINIC | Age: 44
End: 2024-12-16

## 2024-12-16 PROCEDURE — 88305 TISSUE EXAM BY PATHOLOGIST: CPT | Performed by: STUDENT IN AN ORGANIZED HEALTH CARE EDUCATION/TRAINING PROGRAM

## 2024-12-18 ENCOUNTER — ANESTHESIA (OUTPATIENT)
Age: 44
End: 2024-12-18
Payer: COMMERCIAL

## 2024-12-23 NOTE — TELEPHONE ENCOUNTER
----- Message from Bahman Whitheead MD sent at 12/20/2024  4:57 PM EST -----  Removed polyps are hyperplastic repeat colonoscopy in 10 years.

## 2024-12-23 NOTE — TELEPHONE ENCOUNTER
Patient seen results in mychart, A recall to repeat colonoscopy in 10 years was entered, thank you

## 2024-12-30 NOTE — ANESTHESIA PREPROCEDURE EVALUATION
Procedure:  Arthroscopic repair of rotator cuff, possible subacromial decompression, possible biceps tenodesis, plus all other indicated procedures (Left: Shoulder)    Relevant Problems   MUSCULOSKELETAL   (+) Facet degeneration of lumbar region   (+) Low back pain with sciatica        Physical Exam    Airway    Mallampati score: II  TM Distance: >3 FB  Neck ROM: full     Dental       Cardiovascular  Cardiovascular exam normal    Pulmonary  Pulmonary exam normal     Other Findings        Anesthesia Plan  ASA Score- 2     Anesthesia Type- general with ASA Monitors.         Additional Monitors:     Airway Plan:     Comment: + PNB.       Plan Factors-    Chart reviewed. EKG reviewed.  Existing labs reviewed. Patient summary reviewed.                  Induction- intravenous.    Postoperative Plan-         Informed Consent- Anesthetic plan and risks discussed with patient.  I personally reviewed this patient with the CRNA. Discussed and agreed on the Anesthesia Plan with the CRNA..

## 2024-12-30 NOTE — PRE-PROCEDURE INSTRUCTIONS
Pre-Surgery Instructions:   Medication Instructions    azelastine (ASTELIN) 0.1 % nasal spray Uses PRN- OK to take day of surgery    mometasone (NASONEX) 50 mcg/act nasal spray Uses PRN- DO NOT take day of surgery    Medication instructions for day surgery reviewed. Please use only a sip of water to take your instructed medications. Avoid all over the counter vitamins, supplements and NSAIDS for one week prior to surgery per anesthesia guidelines. Tylenol is ok to take as needed.     You will receive a call one business day prior to surgery with an arrival time and hospital directions. If your surgery is scheduled on a Monday, the hospital will be calling you on the Friday prior to your surgery. If you have not heard from anyone by 8pm, please call the hospital supervisor through the hospital  at [phone]. ([place] [phone] or [place] [phone]).    Do not eat or drink anything after midnight the night before your surgery, including candy, mints, lifesavers, or chewing gum. Do not drink alcohol 24hrs before your surgery. Try not to smoke at least 24hrs before your surgery.       Follow the pre surgery showering instructions as listed in the “My Surgical Experience Booklet” or otherwise provided by your surgeon's office. Do not use a blade to shave the surgical area 1 week before surgery. It is okay to use a clean electric clippers up to 24 hours before surgery. Do not apply any lotions, creams, including makeup, cologne, deodorant, or perfumes after showering on the day of your surgery. Do not use dry shampoo, hair spray, hair gel, or any type of hair products.     No contact lenses, eye make-up, or artificial eyelashes. Remove nail polish, including gel polish, and any artificial, gel, or acrylic nails if possible. Remove all jewelry including rings and body piercing jewelry.     Wear causal clothing that is easy to take on and off. Consider your type of surgery.    Keep any valuables, jewelry,  piercings at home. Please bring any specially ordered equipment (sling, braces) if indicated.    Arrange for a responsible person to drive you to and from the hospital on the day of your surgery. Please confirm the visitor policy for the day of your procedure when you receive your phone call with an arrival time.     Call the surgeon's office with any new illnesses, exposures, or additional questions prior to surgery.    Please reference your “My Surgical Experience Booklet” for additional information to prepare for your upcoming surgery.

## 2024-12-31 ENCOUNTER — HOSPITAL ENCOUNTER (OUTPATIENT)
Age: 44
Setting detail: OUTPATIENT SURGERY
Discharge: HOME | End: 2024-12-31
Attending: ORTHOPAEDIC SURGERY | Admitting: ORTHOPAEDIC SURGERY
Payer: COMMERCIAL

## 2024-12-31 VITALS
DIASTOLIC BLOOD PRESSURE: 61 MMHG | OXYGEN SATURATION: 96 % | HEART RATE: 77 BPM | BODY MASS INDEX: 33.36 KG/M2 | WEIGHT: 233 LBS | TEMPERATURE: 97.6 F | HEIGHT: 70 IN | RESPIRATION RATE: 23 BRPM | SYSTOLIC BLOOD PRESSURE: 112 MMHG

## 2024-12-31 DIAGNOSIS — S46.012A TRAUMATIC COMPLETE TEAR OF LEFT ROTATOR CUFF, INITIAL ENCOUNTER: Primary | ICD-10-CM

## 2024-12-31 PROCEDURE — C1713 ANCHOR/SCREW BN/BN,TIS/BN: HCPCS | Performed by: ORTHOPAEDIC SURGERY

## 2024-12-31 PROCEDURE — C9290 INJ, BUPIVACAINE LIPOSOME: HCPCS | Performed by: ANESTHESIOLOGY

## 2024-12-31 PROCEDURE — 29826 SHO ARTHRS SRG DECOMPRESSION: CPT | Performed by: ORTHOPAEDIC SURGERY

## 2024-12-31 PROCEDURE — 29827 SHO ARTHRS SRG RT8TR CUF RPR: CPT | Performed by: ORTHOPAEDIC SURGERY

## 2024-12-31 PROCEDURE — 29827 SHO ARTHRS SRG RT8TR CUF RPR: CPT

## 2024-12-31 PROCEDURE — 29826 SHO ARTHRS SRG DECOMPRESSION: CPT

## 2024-12-31 DEVICE — SP FBRTAK RC FBRTPE BLU & STTPE WH/BLK
Type: IMPLANTABLE DEVICE | Site: SHOULDER | Status: FUNCTIONAL
Brand: ARTHREX®

## 2024-12-31 DEVICE — SP FBRTAK RC FBRTPE BLK/BLU & STTPE BLU
Type: IMPLANTABLE DEVICE | Site: SHOULDER | Status: FUNCTIONAL
Brand: ARTHREX®

## 2024-12-31 DEVICE — 4.75MM BC KNOTLESS SWIVELOCK
Type: IMPLANTABLE DEVICE | Site: SHOULDER | Status: FUNCTIONAL
Brand: ARTHREX®

## 2024-12-31 RX ORDER — OXYCODONE HYDROCHLORIDE 5 MG/1
5 TABLET ORAL EVERY 4 HOURS PRN
Qty: 15 TABLET | Refills: 0 | Status: SHIPPED | OUTPATIENT
Start: 2024-12-31

## 2024-12-31 RX ORDER — DEXAMETHASONE SODIUM PHOSPHATE 10 MG/ML
INJECTION, SOLUTION INTRAMUSCULAR; INTRAVENOUS AS NEEDED
Status: DISCONTINUED | OUTPATIENT
Start: 2024-12-31 | End: 2024-12-31

## 2024-12-31 RX ORDER — OXYCODONE HYDROCHLORIDE 5 MG/1
5 TABLET ORAL EVERY 4 HOURS PRN
Refills: 0 | Status: DISCONTINUED | OUTPATIENT
Start: 2024-12-31 | End: 2024-12-31 | Stop reason: HOSPADM

## 2024-12-31 RX ORDER — MIDAZOLAM HYDROCHLORIDE 2 MG/2ML
INJECTION, SOLUTION INTRAMUSCULAR; INTRAVENOUS AS NEEDED
Status: DISCONTINUED | OUTPATIENT
Start: 2024-12-31 | End: 2024-12-31

## 2024-12-31 RX ORDER — LIDOCAINE HYDROCHLORIDE 10 MG/ML
INJECTION, SOLUTION EPIDURAL; INFILTRATION; INTRACAUDAL; PERINEURAL AS NEEDED
Status: DISCONTINUED | OUTPATIENT
Start: 2024-12-31 | End: 2024-12-31

## 2024-12-31 RX ORDER — FENTANYL CITRATE 50 UG/ML
INJECTION, SOLUTION INTRAMUSCULAR; INTRAVENOUS AS NEEDED
Status: DISCONTINUED | OUTPATIENT
Start: 2024-12-31 | End: 2024-12-31

## 2024-12-31 RX ORDER — CHLORHEXIDINE GLUCONATE ORAL RINSE 1.2 MG/ML
15 SOLUTION DENTAL ONCE
Status: COMPLETED | OUTPATIENT
Start: 2024-12-31 | End: 2024-12-31

## 2024-12-31 RX ORDER — BUPIVACAINE HYDROCHLORIDE 5 MG/ML
INJECTION, SOLUTION EPIDURAL; INTRACAUDAL AS NEEDED
Status: DISCONTINUED | OUTPATIENT
Start: 2024-12-31 | End: 2024-12-31

## 2024-12-31 RX ORDER — GLYCOPYRROLATE 0.2 MG/ML
INJECTION INTRAMUSCULAR; INTRAVENOUS AS NEEDED
Status: DISCONTINUED | OUTPATIENT
Start: 2024-12-31 | End: 2024-12-31

## 2024-12-31 RX ORDER — NAPROXEN 500 MG/1
500 TABLET ORAL 2 TIMES DAILY WITH MEALS
Qty: 60 TABLET | Refills: 0 | Status: SHIPPED | OUTPATIENT
Start: 2024-12-31

## 2024-12-31 RX ORDER — ACETAMINOPHEN 325 MG/1
325 TABLET ORAL EVERY 6 HOURS PRN
Qty: 30 TABLET | Refills: 0 | Status: SHIPPED | OUTPATIENT
Start: 2024-12-31

## 2024-12-31 RX ORDER — SODIUM CHLORIDE, SODIUM LACTATE, POTASSIUM CHLORIDE, CALCIUM CHLORIDE 600; 310; 30; 20 MG/100ML; MG/100ML; MG/100ML; MG/100ML
125 INJECTION, SOLUTION INTRAVENOUS CONTINUOUS
Status: DISCONTINUED | OUTPATIENT
Start: 2024-12-31 | End: 2024-12-31 | Stop reason: HOSPADM

## 2024-12-31 RX ORDER — PROPOFOL 10 MG/ML
INJECTION, EMULSION INTRAVENOUS AS NEEDED
Status: DISCONTINUED | OUTPATIENT
Start: 2024-12-31 | End: 2024-12-31

## 2024-12-31 RX ORDER — ROCURONIUM BROMIDE 10 MG/ML
INJECTION, SOLUTION INTRAVENOUS AS NEEDED
Status: DISCONTINUED | OUTPATIENT
Start: 2024-12-31 | End: 2024-12-31

## 2024-12-31 RX ORDER — HYDROMORPHONE HCL/PF 1 MG/ML
0.5 SYRINGE (ML) INJECTION
Status: DISCONTINUED | OUTPATIENT
Start: 2024-12-31 | End: 2024-12-31 | Stop reason: HOSPADM

## 2024-12-31 RX ORDER — CEFAZOLIN SODIUM 2 G/50ML
2000 SOLUTION INTRAVENOUS ONCE
Status: COMPLETED | OUTPATIENT
Start: 2024-12-31 | End: 2024-12-31

## 2024-12-31 RX ORDER — FENTANYL CITRATE/PF 50 MCG/ML
25 SYRINGE (ML) INJECTION
Status: DISCONTINUED | OUTPATIENT
Start: 2024-12-31 | End: 2024-12-31 | Stop reason: HOSPADM

## 2024-12-31 RX ORDER — ONDANSETRON 2 MG/ML
INJECTION INTRAMUSCULAR; INTRAVENOUS AS NEEDED
Status: DISCONTINUED | OUTPATIENT
Start: 2024-12-31 | End: 2024-12-31

## 2024-12-31 RX ORDER — PHENYLEPHRINE HCL IN 0.9% NACL 1 MG/10 ML
SYRINGE (ML) INTRAVENOUS AS NEEDED
Status: DISCONTINUED | OUTPATIENT
Start: 2024-12-31 | End: 2024-12-31

## 2024-12-31 RX ORDER — ONDANSETRON 2 MG/ML
4 INJECTION INTRAMUSCULAR; INTRAVENOUS ONCE AS NEEDED
Status: DISCONTINUED | OUTPATIENT
Start: 2024-12-31 | End: 2024-12-31 | Stop reason: HOSPADM

## 2024-12-31 RX ADMIN — BUPIVACAINE 20 ML: 13.3 INJECTION, SUSPENSION, LIPOSOMAL INFILTRATION at 07:07

## 2024-12-31 RX ADMIN — LIDOCAINE HYDROCHLORIDE 50 MG: 10 INJECTION, SOLUTION EPIDURAL; INFILTRATION; INTRACAUDAL; PERINEURAL at 07:36

## 2024-12-31 RX ADMIN — PROPOFOL 200 MG: 10 INJECTION, EMULSION INTRAVENOUS at 07:36

## 2024-12-31 RX ADMIN — DEXAMETHASONE SODIUM PHOSPHATE 10 MG: 10 INJECTION, SOLUTION INTRAMUSCULAR; INTRAVENOUS at 07:58

## 2024-12-31 RX ADMIN — FENTANYL CITRATE 100 MCG: 50 INJECTION INTRAMUSCULAR; INTRAVENOUS at 07:02

## 2024-12-31 RX ADMIN — SODIUM CHLORIDE, SODIUM LACTATE, POTASSIUM CHLORIDE, AND CALCIUM CHLORIDE 125 ML/HR: .6; .31; .03; .02 INJECTION, SOLUTION INTRAVENOUS at 06:40

## 2024-12-31 RX ADMIN — CHLORHEXIDINE GLUCONATE 0.12% ORAL RINSE 15 ML: 1.2 LIQUID ORAL at 06:23

## 2024-12-31 RX ADMIN — Medication 200 MCG: at 08:16

## 2024-12-31 RX ADMIN — BUPIVACAINE HYDROCHLORIDE 10 ML: 5 INJECTION, SOLUTION EPIDURAL; INTRACAUDAL; PERINEURAL at 07:07

## 2024-12-31 RX ADMIN — Medication 100 MCG: at 08:26

## 2024-12-31 RX ADMIN — Medication 100 MCG: at 07:55

## 2024-12-31 RX ADMIN — Medication 200 MCG: at 08:07

## 2024-12-31 RX ADMIN — MIDAZOLAM 2 MG: 1 INJECTION INTRAMUSCULAR; INTRAVENOUS at 07:02

## 2024-12-31 RX ADMIN — GLYCOPYRROLATE 0.2 MG: 0.2 INJECTION INTRAMUSCULAR; INTRAVENOUS at 07:59

## 2024-12-31 RX ADMIN — CEFAZOLIN SODIUM 2000 MG: 2 SOLUTION INTRAVENOUS at 07:25

## 2024-12-31 RX ADMIN — Medication 200 MCG: at 08:20

## 2024-12-31 RX ADMIN — ROCURONIUM BROMIDE 50 MG: 10 INJECTION, SOLUTION INTRAVENOUS at 07:36

## 2024-12-31 RX ADMIN — SUGAMMADEX 200 MG: 100 INJECTION, SOLUTION INTRAVENOUS at 08:33

## 2024-12-31 RX ADMIN — ONDANSETRON 4 MG: 2 INJECTION INTRAMUSCULAR; INTRAVENOUS at 08:32

## 2024-12-31 RX ADMIN — Medication 100 MCG: at 08:02

## 2024-12-31 RX ADMIN — Medication 100 MCG: at 07:53

## 2024-12-31 NOTE — DISCHARGE INSTR - AVS FIRST PAGE
POSTOPERATIVE INSTRUCTIONS following ROTATOR CUFF REPAIR    MEDICATIONS:  Resume all home medications unless otherwise instructed by your surgeon.  Pain Medication:  Oxycodone 5 mg, 1 tablets every 4 hours as needed  If you were given a regional anesthetic (nerve block), please begin taking the pain medication as soon as you get home, even if you have minimal or no pain.  DO NOT WAIT FOR THE NERVE BLOCK TO WEAR OFF.  Possible side effects include nausea, constipation, and urinary retention.  If you experience these side effects, please call our office for assistance.  Pain med refills are authorized only during office hours (8am-4pm, Mon-Fri).  Anti-Inflammatory:  Naproxen 500 mg, 1 tablet every 12 hours for 4 weeks and Tylenol 325 mg, 1-2 tablets every 6 hours for 4 weeks  TAKE WITH FOOD.  Stop if you experience nausea, reflux, or stomach pain.    WOUND CARE:  Keep the dressing clean and dry.  Light drainage may occur the first 2 days postop.  You may remove the dressings and get the incision wet in the shower 72 hours after surgery.  Do not remove steri-strips or sutures.  Do not immerse the incision under water.  Carefully pat the incision dry.  If there is wound drainage, re-apply a fresh dry gauze dressing.  Please call our office ([phone]) if you experience either of the following:  Sudden increase in swelling, redness, or warmth at the surgical site  Excessive incisional drainage that persists beyond the 3rd day after surgery  Oral temperature greater than 101 degrees, not relieved with Tylenol  Shortness of breath, chest pain, nausea, or any other concerning symptoms    SWELLING CONTROL:  Cold Therapy:  The cold therapy device may be used either continuously or only as needed, according to your preference.  Do not let the pad directly touch your skin.  Alternatively, apply ice (20 min on, 20 min off) as often as you feel is necessary.    SLING:  Wear your sling at all times (including sleep) until your  first postoperative office visit.  You may remove the sling for showering but must keep your arm at your side.    ACTIVITY:   Do not lift, carry, push, or pull anything with your operative arm.  You are not allowed range of motion of your shoulder until you are given permission from your surgeon. You may do gentle range of motion of the elbow, wrist, and fingers.  Place a pillow behind the elbow while lying down.  Sleeping in a more upright position (recliner) may be more comfortable initially.  Wrist / Finger Motion:  With the sling on, move your wrist and fingers through a full range of motion 20 times per hour while awake.    PHYSICAL THERAPY:  You may begin physical therapy after you are seen in the office for your postoperative appointment. We will place a referral to physical therapy after the procedure so you can call in advance to get an appointment set up after the first postoperative appointment.    FOLLOW-UP APPOINTMENT:  10-14 days after surgery with:    Dr. Sylvain Lozano MD  [facility]  [address] [place], [address] ([facility])  [address] [place], [address] [facility])  [phone]

## 2024-12-31 NOTE — ANESTHESIA PROCEDURE NOTES
Peripheral Block    Patient location during procedure: holding area  Start time: 12/31/2024 7:07 AM  Reason for block: at surgeon's request and post-op pain management  Staffing  Performed by: Singh Hogue MD  Authorized by: Singh Hogue MD    Preanesthetic Checklist  Completed: patient identified, IV checked, site marked, risks and benefits discussed, surgical consent, monitors and equipment checked, pre-op evaluation and timeout performed  Peripheral Block  Patient position: sitting  Prep: ChloraPrep  Patient monitoring: frequent blood pressure checks, continuous pulse oximetry and heart rate  Block type: Interscalene  Laterality: left  Injection technique: single-shot  Procedures: ultrasound guided, Ultrasound guidance required for the procedure to increase accuracy and safety of medication placement and decrease risk of complications.  Ultrasound permanent image saved    Needle  Needle type: Stimuplex   Needle gauge: 20 G  Needle length: 4 in  Needle localization: anatomical landmarks and ultrasound guidance  Assessment  Injection assessment: incremental injection, frequent aspiration, injected with ease, negative aspiration, negative for heart rate change, no paresthesia on injection, no symptoms of intraneural/intravenous injection and needle tip visualized at all times  Paresthesia pain: none  Post-procedure:  site cleaned  patient tolerated the procedure well with no immediate complications

## 2024-12-31 NOTE — ANESTHESIA POSTPROCEDURE EVALUATION
Post-Op Assessment Note    CV Status:  Stable    Pain management: adequate       Mental Status:  Alert and awake   Hydration Status:  Euvolemic   PONV Controlled:  Controlled   Airway Patency:  Patent     Post Op Vitals Reviewed: Yes    No anethesia notable event occurred.    Staff: Anesthesiologist, CRNA           Last Filed PACU Vitals:  Vitals Value Taken Time   Temp 97.6 °F (36.4 °C) 12/31/24 0930   Pulse 75 12/31/24 0930   /64 12/31/24 0930   Resp 18 12/31/24 0930   SpO2 94 % 12/31/24 0930       Modified Dilan:  Activity: 1 (12/31/2024  9:30 AM)  Respiration: 2 (12/31/2024  9:30 AM)  Circulation: 2 (12/31/2024  9:30 AM)  Consciousness: 2 (12/31/2024  9:30 AM)  Oxygen Saturation: 2 (12/31/2024  9:30 AM)  Modified Dilan Score: 9 (12/31/2024  9:30 AM)

## 2024-12-31 NOTE — ANESTHESIA POSTPROCEDURE EVALUATION
Post-Op Assessment Note    CV Status:  Stable  Pain Score: 0    Pain management: adequate       Mental Status:  Alert and awake   Hydration Status:  Euvolemic   PONV Controlled:  Controlled   Airway Patency:  Patent     Post Op Vitals Reviewed: Yes    No anethesia notable event occurred.    Staff: CRNA           Last Filed PACU Vitals:  Vitals Value Taken Time   Temp 97.8    Pulse 86 12/31/24 0902   /66 12/31/24 0900   Resp 20 12/31/24 0902   SpO2 100 % 12/31/24 0902   Vitals shown include unfiled device data.    Modified Dilan:  No data recorded

## 2024-12-31 NOTE — OP NOTE
OPERATIVE REPORT  PATIENT NAME: Sylvain Choi    :  1980  MRN: [id number]  Pt Location: [facility] OR ROOM 04    SURGERY DATE: 2024    Surgeons and Role:     * Sylvain Lozano MD - Primary     * MARIPOSA Davis-C - Assisting    Preop Diagnosis:  Traumatic complete tear of left rotator cuff, initial encounter [S46.012A]    Post-Op Diagnosis Codes:     * Traumatic complete tear of left rotator cuff, initial encounter [S46.012A]    Procedure(s):  Left - Arthroscopic repair of rotator cuff. subacromial decompression.    Specimen(s):  * No specimens in log *    Estimated Blood Loss:   Minimal    Drains:  * No LDAs found *    Anesthesia Type:   General w/ Regional    Operative Indications:  Traumatic complete tear of left rotator cuff, initial encounter [S46.012A]      Operative Findings:  Complete rupture of the supraspinatus tendon with minimal retraction, crescent-shaped tear 2 x 2 cm  Impingement with type II acromial spurring of the acromion      Complications:   None    Procedure and Technique:  NAME OF OPERATION:    LEFT  Shoulder arthroscopic rotator cuff repair of the supraspinatus and Infraspinatus (CPT 60223)   Left  Shoulder arthroscopic subacromial decompression, acromioplasty (CPT 63785)       ASSISTANT: I requested MARIPOSA Alston to assist with this procedure. Assistance was medically necessary in order to safely perform the procedure without increased blood loss or morbidity. Assistance was provided through positioning, instrumentation, wound closure, and instillation of anesthetic, application of sterile dressing, and safe transport from the operative suite.     There was no qualified resident available to assist     INDICATIONS:  Sylvain Choi is a 44 y.o. patient who had injured their shoulder, followed by pain and dysfunction.  he had failed conservative measures and was therefore indicated for surgical repair.  he understood the risks, benefits and alternatives to the procedure,  and elected to proceed.     The patient was identified in the preoperative holding area and the correct operative site was signed. The patient was then brought into the Operating Room and Anesthesia then successfully introduced a regional block without complications. The patient was then positioned in a beach chair position. The operative shoulder and upper extremity were prepped and draped in the usual sterile fashion. Prior to making incision, a time-out occurred at which time all members of the surgical team confirmed the patient identity, laterality and correct operation against the informed written consent. It was also confirmed that the patient received preoperative antibiotics.     A posterior portal was established. The arthroscope was inserted into the glenohumeral joint. An anterior portal was established in the rotator interval. Diagnostic arthroscopy then took place. The articular surfaces of the glenoid and humeral head were within normal limits. The labrum circumferentially had some mild fraying that was gently debrided with an arthroscopic shaver. The labral attachment to the bone was solid, therefore, no labral repair was warranted.  The long head of the biceps tendon was intact and within normal limits.  The rotator cuff was examined and demonstrated a moderate crescent shaped tear of the supraspinatus and tendon with minimal retraction.  The subscapularis and teres minor tendons were within normal limits.     The arthroscope was inserted into the subacromial space. There was an impingement lesion of the CA ligament. The CA ligament was removed, revealing a Type II acromial spur. A 5.5 shaver was used to siva this down such that it was flush with the remainder of the acromion.   An extensive bursectomy then took place; the bursa was hyperemic as was the rotator cuff. This revealed a crescent shaped 2cm x 2cm tear in the supraspinatus tendon.  2 fiber tack anchors were placed at the articular margin,  these were then through the medial rotator cuff using a scorpion suture passer    The sutures were then placed laterally and the greater tuberosity brought down and speed bridge/cross configuration nicely restoring the rotator cuff to its anatomic footprint.     At this point, all arthroscopic instruments were removed. The portal sites were closed with 3-0 monocryl. Dry sterile dressing was applied. Patient was placed in a shoulder immobilizer and brought to the recovery room in stable condition.     At the conclusion of the case, the patient tolerated the procedure well. No complications. All sponge and instrument counts were correct.     I was present for the entire procedure.     Patient Disposition:  PACU    -------------------------------------     Post-Operative Rehabilitation Guidelines for Standard Rotator Cuff Tears     1-4 Weeks:   Sling Immobilization  Active ROM Elbow, Wrist and Hand  True Passive (ONLY) ROM Shoulder. NO ACTIVE MOTION.   Pendulums, Supine Elevation in Scapular plane, External Rotation to tolerance with arm at side. (emphasize ER, minimum goal 40°)  Scapular Stabilization exercises (sidelying)  Deltoid isometrics in neutral (submaximal) as ROM improves  No Pulley/Canes until 6 weeks post-op (these are active motions)     4-6 Weeks:   Discontinue sling use.  Begin Active Assist ROM and advance to Active as Tolerated  Elevation in scapular plane and external rotation as tolerated  No Internal rotation or behind back until 6wks.  Begin Cuff Isometrics at 6wks with arm at the side     6-12 Weeks:   Active Assist to Active ROM Shoulder As Tolerated  Elevation in scapular plane and external rotation to tolerance  Begin internal rotation as tolerated  Light stretching at end ranges  Cuff Isometrics with the arm at the side     3-12 Months:  Advance to full ROM as tolerated with passive stretching at end ranges  Advance strengthening as tolerated: isometrics ? bands ? light weights (1-5   lbs);  8-12 reps/2-3 sets per rotator cuff, deltoid, and scapular stabilizers  Limit strengthening 3x/week to avoid rotator cuff tendonitis  Begin eccentrically resisted motions, pylometrics (ex. Weighted ball toss),  proprioception (es. body blade)  Begin sports related rehab at 4 ½ months, including advanced conditioning  Return to throwing at 6 months  Throw from pitcher's mound at 9 months  Collision sports at 9 months  MMI is usually at 12 months post-op           SIGNATURE: Sylvain Lozano MD  DATE: December 31, 2024  TIME: 8:31 AM

## 2025-01-07 ENCOUNTER — EVALUATION (OUTPATIENT)
Dept: PHYSICAL THERAPY | Facility: CLINIC | Age: 45
End: 2025-01-07
Payer: COMMERCIAL

## 2025-01-07 DIAGNOSIS — S46.012A TRAUMATIC COMPLETE TEAR OF LEFT ROTATOR CUFF, INITIAL ENCOUNTER: Primary | ICD-10-CM

## 2025-01-07 DIAGNOSIS — M25.512 ACUTE PAIN OF LEFT SHOULDER: ICD-10-CM

## 2025-01-07 PROCEDURE — 97161 PT EVAL LOW COMPLEX 20 MIN: CPT

## 2025-01-07 PROCEDURE — 97112 NEUROMUSCULAR REEDUCATION: CPT

## 2025-01-07 NOTE — PROGRESS NOTES
PT Evaluation     Today's date: 2025  Patient name: Sylvain Choi  : 1980  MRN: [id number]  Referring provider: Davin Arias PA*  Dx:   Encounter Diagnosis     ICD-10-CM    1. Traumatic complete tear of left rotator cuff, initial encounter  S46.012A Ambulatory referral to Physical Therapy      2. Acute pain of left shoulder  M25.512                      Assessment  Impairments: abnormal coordination, abnormal or restricted ROM, activity intolerance, impaired physical strength, lacks appropriate home exercise program, pain with function, unable to perform ADL, participation limitations, activity limitations and endurance  Symptom irritability: high    Assessment details: Pt is a 44 yom presenting for initial eval of LEFT shoulder s/p RC repair and subacromial decompression. Current impairments include pain, dec PROM, no AROM, no ability to use shoulder muscle on the R d/t post op status, dec scapular NM control, and dec UE NM control. These limit the patient's ability to complete ADL's, driving, work, sleeping, playing with his kid, taking care of the house, lawn, etc, or doing all recreational activities at their prior level of function. Skilled physical therapy is recommended at this time in order to address deficits and progress towards all goals outlined in this POC.      Understanding of Dx/Px/POC: good     Prognosis: good    Goals  STG's:  1. In 4 weeks, pt will have 90d of flexion, 35d of ER, and 60d abd.  2 In 4 weeks, pt will be sleeping 3-4 hrs w/out interruption by pain.  3. In 4 weeks, pt will tolerate walking 30 mins in sling w/out sh pain.  4. In 6 weeks, pt will tolerate being out of sling for duration of the day.     LTG's:  1. In 12 weeks, pt will restore full AROM LUCIANO.  2. In 12 weeks, pt will demo 4-/5 MMT.  3. In 16 weeks, pt will demo 4/5 MMT.  4. In 16 weeks, pt will tolerate lifting 15# at their side.  5. In 20 weeks, pt will tolerate lifting 25# at side and at least 5#  OH.      Plan  Patient would benefit from: skilled physical therapy  Planned modality interventions: low level laser therapy    Planned therapy interventions: joint mobilization, manual therapy, neuromuscular re-education, patient/caregiver education, self care, strengthening, therapeutic activities, therapeutic exercise, stretching, therapeutic training, home exercise program, graded exercise, graded activity, functional ROM exercises, flexibility, coordination, activity modification and ADL retraining    Frequency: 2x week  Duration in weeks: 24  Plan of Care beginning date: 2025  Treatment plan discussed with: patient        Subjective Evaluation    History of Present Illness  Date of onset: 10/31/2024  Date of surgery: 2024  Mechanism of injury: surgery  Mechanism of injury: Pt is a 44 yom presenting for initial eval s/p LEFT RC repair on 24. Pt initially hurt the arm on Halloween when he fell on the arm outstretch and to the side. He had a nerve block that allowed him to feel good the first 48 hrs after surgery. Once that wore off he had 5/10 pain at worst and 2-3/10 at best. He has followed sling wearing instructions and sleeping in the sling as well. Laying flat on his back gets achy but he has tolerated sleeping on his R side more. He is only taking Tylenol as needed. He follows up w/ Dr. Lozano on .     Quality of life: good    Patient Goals  Patient goals for therapy: decreased edema, decreased pain, increased motion, return to work, return to sport/leisure activities, independence with ADLs/IADLs and increased strength  Patient goal: Play with his daughter without pain  Pain  Current pain ratin  At best pain ratin  At worst pain ratin  Quality: dull ache and throbbing  Relieving factors: medications, rest, support and ice  Aggravating factors: eating, sitting, standing, walking, lifting, keyboarding and overhead activity  Progression: no change          Objective      Postural Observations    Additional Postural Observation Details  Forward head and rounded shoulder position in sitting.     Observations     Right Shoulder  Positive for edema and incision.     Additional Observation Details  Sling with pillow.    Palpation   Left   No palpable tenderness to the biceps.   Tenderness of the anterior deltoid, biceps, infraspinatus, middle deltoid, supraspinatus, teres minor and upper trapezius.     Tenderness     Left Shoulder   Tenderness in the AC joint, biceps tendon (proximal), infraspinatus tendon, subacromial bursa and supraspinatus tendon.     Cervical/Thoracic Screen   Cervical range of motion within normal limits    Active Range of Motion     Right Shoulder   Normal active range of motion    Additional Active Range of Motion Details  No AROM on the L performed d/t post op status.     Passive Range of Motion   Left Shoulder   Flexion: 50 degrees with pain  External rotation 0°: 15 degrees with pain  Internal rotation 0°: Left shoulder passive internal rotation at 0 degrees: to body.     Right Shoulder   Normal passive range of motion    Strength/Myotome Testing     Right Shoulder   Normal muscle strength    Additional Strength Details  No MMT performed on L d/t post op status.     Tests     Additional Tests Details  Special testing held d/t post op status.              Precautions: RC repair protocol in op notes (attached to chart)  Dx: LEFT RC repair and subacromial decompression 12/31/24    Manuals 1/7            Shoulder PROM 10 mins                                                   Neuro Re-Ed             Pendulums  2x10           Scap retraction HEP            Seated c/s retraction             Self upper trap stretch on L             Elbow AROM HEP            Gripping HEP                                                   Ther Ex                                                                                                                     Ther Activity                                        Gait Training                                       Modalities

## 2025-01-08 PROBLEM — H61.23 BILATERAL IMPACTED CERUMEN: Status: RESOLVED | Noted: 2024-12-09 | Resolved: 2025-01-08

## 2025-01-09 ENCOUNTER — APPOINTMENT (OUTPATIENT)
Dept: PHYSICAL THERAPY | Facility: CLINIC | Age: 45
End: 2025-01-09
Payer: COMMERCIAL

## 2025-01-14 ENCOUNTER — OFFICE VISIT (OUTPATIENT)
Dept: OBGYN CLINIC | Facility: CLINIC | Age: 45
End: 2025-01-14

## 2025-01-14 ENCOUNTER — TELEPHONE (OUTPATIENT)
Age: 45
End: 2025-01-14

## 2025-01-14 VITALS — HEIGHT: 70 IN | WEIGHT: 233 LBS | BODY MASS INDEX: 33.36 KG/M2

## 2025-01-14 DIAGNOSIS — Z98.890 STATUS POST LEFT ROTATOR CUFF REPAIR: Primary | ICD-10-CM

## 2025-01-14 PROCEDURE — 99024 POSTOP FOLLOW-UP VISIT: CPT | Performed by: ORTHOPAEDIC SURGERY

## 2025-01-14 NOTE — PROGRESS NOTES
Subjective   CHIEF COMPLAINT/REASON FOR VISIT  Sylvain Choi is a 44 y.o. male who presents for 2 week post op follow-up after Arthroscopic repair of rotator cuff, subacromial decompression, - Left on 12/31/2024     HISTORY OF PRESENT ILLNESS  Overall, he feels things are going well and progressing appropriately. Currently, he is doing well. He reports some mild achy-ness in the shoulder but feels this is improving. He said he is set-up with PT.    Objective   PHYSICAL EXAMINATION  Left Shoulder  Surgical incisions well healed  ROM of shoulder not tested  ROM of elbow, wrist, and hand intact  Fingers warm and perfused  NVID     Assessment & Plan   1. Status Post Arthroscopic repair of rotator cuff, subacromial decompression, - Left    Patient is making appropriate progress from the date of their surgery  Advised he may discontinue the sling. He may wean out of the sling over the next couple weeks if he needs  Encouraged to work with PT on rehab protocol. PT order placed.  We will plan to see him back at the 3 month post-operative jimmy  If any issues, questions, or concerns arise between now and the next appointment, we have encouraged the patient contact our team.    Scribe Attestation      I,:  Davin Arias PA-C am acting as a scribe while in the presence of the attending physician.:       I,:  Sylvain Lozano MD personally performed the services described in this documentation    as scribed in my presence.:

## 2025-01-14 NOTE — LETTER
January 14, 2025     Patient: Sylvain Choi  YOB: 1980  Date of Visit: 1/14/2025      To Whom it May Concern:    Sylvain Choi is under my professional care. Sylvain was seen in my office on 1/14/2025. Sylvain may return to work on January 14, 2025.  He should avoid lifting anything heavier than 5 pounds or overhead activity for the next 3 months.    If you have any questions or concerns, please don't hesitate to call.         Sincerely,          Sylvain Lozano MD        CC: No Recipients

## 2025-01-14 NOTE — TELEPHONE ENCOUNTER
Caller: Alexandru [email or web address] [email or web address]    Reason for call: Called to confirm surgery for patient; advised to seek information from patient.     
No

## 2025-01-15 ENCOUNTER — OFFICE VISIT (OUTPATIENT)
Dept: PHYSICAL THERAPY | Facility: CLINIC | Age: 45
End: 2025-01-15
Payer: COMMERCIAL

## 2025-01-15 DIAGNOSIS — Z98.890 STATUS POST LEFT ROTATOR CUFF REPAIR: ICD-10-CM

## 2025-01-15 PROCEDURE — 97140 MANUAL THERAPY 1/> REGIONS: CPT

## 2025-01-15 PROCEDURE — 97112 NEUROMUSCULAR REEDUCATION: CPT

## 2025-01-15 NOTE — PROGRESS NOTES
Daily Note     Today's date: 1/15/2025  Patient name: Sylvain Choi  : 1980  MRN: [id number]  Referring provider: Davin Arias PA*  Dx:   Encounter Diagnosis     ICD-10-CM    1. Status post left rotator cuff repair  Z98.890 Ambulatory Referral to Physical Therapy                     Subjective: Pt followed up w/ ortho for 2 week post op appt . They discharged use of sling and pt is returning to work today where he is at a desk for most of the day.       Objective: See treatment diary below      Assessment: Tolerated treatment well. Patient demonstrated fatigue post treatment. Achy pain was present at the shoulder w/ PROM flexion up to 80d today and ER up to 30d. Limited by pain past those points. PROM abduction up to 50d and pendulums felt relieving of the achy pain.      Plan: Continue per plan of care.      Precautions: RC repair protocol in op notes (attached to chart)  Dx: LEFT RC repair and subacromial decompression 24    Manuals 1/7 1/15           Shoulder PROM 10 mins 15 mins                                                  Neuro Re-Ed             Pendulums HEP 2x10 (dead hang variation)           Scap retraction HEP 2x10           Seated c/s retraction             Self upper trap stretch on L             Elbow AROM HEP 3x10           Gripping HEP R digigrip 2x10                                                  Ther Ex                                                                                                                     Ther Activity                                       Gait Training                                       Modalities

## 2025-01-17 ENCOUNTER — OFFICE VISIT (OUTPATIENT)
Dept: PHYSICAL THERAPY | Facility: CLINIC | Age: 45
End: 2025-01-17
Payer: COMMERCIAL

## 2025-01-17 DIAGNOSIS — M25.512 ACUTE PAIN OF LEFT SHOULDER: ICD-10-CM

## 2025-01-17 DIAGNOSIS — Z98.890 STATUS POST LEFT ROTATOR CUFF REPAIR: Primary | ICD-10-CM

## 2025-01-17 DIAGNOSIS — S46.012A TRAUMATIC COMPLETE TEAR OF LEFT ROTATOR CUFF, INITIAL ENCOUNTER: ICD-10-CM

## 2025-01-17 PROCEDURE — 97140 MANUAL THERAPY 1/> REGIONS: CPT

## 2025-01-17 PROCEDURE — 97112 NEUROMUSCULAR REEDUCATION: CPT

## 2025-01-17 NOTE — PROGRESS NOTES
"Daily Note     Today's date: 2025  Patient name: Sylvain Choi  : 1980  MRN: [id number]  Referring provider: Davin Arias PA*  Dx:   Encounter Diagnosis     ICD-10-CM    1. Status post left rotator cuff repair  Z98.890       2. Traumatic complete tear of left rotator cuff, initial encounter  S46.012A       3. Acute pain of left shoulder  M25.512                      Subjective: States he is doing about the same today. Returned to work 2 days ago and wore the sling while there for 1 day d/t increased achy pain. Overall, doing well and figuring out his limits w/ tolerance to ADL's.       Objective: See treatment diary below      Assessment: Tolerated treatment well. Patient demonstrated fatigue post treatment and exhibited good technique with therapeutic exercises. Achieved 80d flexion and about 30d ER in neutral today. Increased pain noted w/ ER more than FF that subsided to baseline levels w/ rest.       Plan: Continue per plan of care.      Precautions: RC repair protocol in op notes (attached to chart)  Dx: LEFT RC repair and subacromial decompression 24    Manuals 1/7 1/15 1/17          Shoulder PROM 10 mins 15 mins 12 mins                                                 Neuro Re-Ed             Pendulums HEP 2x10 (dead hang variation) 2x10 (dead hang)          Scap retraction HEP 2x10 2x10x5\"          Seated c/s retraction             Self upper trap stretch on L   2x20\"          Elbow AROM HEP 3x10 3x10          Gripping HEP R digigrip 2x10 G digigrip 2x10                                                 Ther Ex                                                                                                                     Ther Activity                                       Gait Training                                       Modalities                                              "

## 2025-01-20 ENCOUNTER — APPOINTMENT (OUTPATIENT)
Dept: PHYSICAL THERAPY | Facility: CLINIC | Age: 45
End: 2025-01-20
Payer: COMMERCIAL

## 2025-01-24 ENCOUNTER — OFFICE VISIT (OUTPATIENT)
Dept: PHYSICAL THERAPY | Facility: CLINIC | Age: 45
End: 2025-01-24
Payer: COMMERCIAL

## 2025-01-24 DIAGNOSIS — Z98.890 STATUS POST LEFT ROTATOR CUFF REPAIR: Primary | ICD-10-CM

## 2025-01-24 DIAGNOSIS — S46.012A TRAUMATIC COMPLETE TEAR OF LEFT ROTATOR CUFF, INITIAL ENCOUNTER: ICD-10-CM

## 2025-01-24 DIAGNOSIS — M25.512 ACUTE PAIN OF LEFT SHOULDER: ICD-10-CM

## 2025-01-24 PROCEDURE — 97112 NEUROMUSCULAR REEDUCATION: CPT

## 2025-01-24 PROCEDURE — 97140 MANUAL THERAPY 1/> REGIONS: CPT

## 2025-01-24 NOTE — PROGRESS NOTES
"Daily Note     Today's date: 2025  Patient name: Sylvain Choi  : 1980  MRN: [id number]  Referring provider: Davin Arias PA*  Dx:   Encounter Diagnosis     ICD-10-CM    1. Status post left rotator cuff repair  Z98.890       2. Traumatic complete tear of left rotator cuff, initial encounter  S46.012A       3. Acute pain of left shoulder  M25.512                      Subjective: Pt states his shoulder cont to have intermittent achy pain depending on the type of movement he has to do or accidentally does. He is trying to limit how often he reaches forward to the side by mistake but that is what feels the worst.       Objective: See treatment diary below      Assessment: Tolerated treatment well. Patient demonstrated fatigue post treatment. Cueing req to improve scapular NM control and dec compensations at the UT.       Plan: Continue per plan of care.      Precautions: RC repair protocol in op notes (attached to chart)  Dx: LEFT RC repair and subacromial decompression 24    Manuals 1/7 1/15 1/17 1/24         Shoulder PROM 10 mins 15 mins 12 mins 12 mins                                                Neuro Re-Ed             Pendulums HEP 2x10 (dead hang variation) 2x10 (dead hang) 3x10\"         Seated scap retraction HEP 2x10 2x10x5\" 3x10x5\"         Standing scap ret w/ arms at side    2x10x5\"         Seated c/s retraction    1x10         Self upper trap stretch on L   2x20\" 2x20\"         Elbow AROM HEP 3x10 3x10 1# 2x10         Gripping HEP R digigrip 2x10 G digigrip 2x10                                                 Ther Ex             Wrist ext and flex    1# 2x10 ea movement                                                                                                    Ther Activity                                       Gait Training                                       Modalities                                                "

## 2025-01-27 ENCOUNTER — OFFICE VISIT (OUTPATIENT)
Dept: PHYSICAL THERAPY | Facility: CLINIC | Age: 45
End: 2025-01-27
Payer: COMMERCIAL

## 2025-01-27 DIAGNOSIS — S46.012A TRAUMATIC COMPLETE TEAR OF LEFT ROTATOR CUFF, INITIAL ENCOUNTER: ICD-10-CM

## 2025-01-27 DIAGNOSIS — Z98.890 STATUS POST LEFT ROTATOR CUFF REPAIR: Primary | ICD-10-CM

## 2025-01-27 DIAGNOSIS — M25.512 ACUTE PAIN OF LEFT SHOULDER: ICD-10-CM

## 2025-01-27 PROCEDURE — 97112 NEUROMUSCULAR REEDUCATION: CPT

## 2025-01-27 PROCEDURE — 97140 MANUAL THERAPY 1/> REGIONS: CPT

## 2025-01-27 NOTE — PROGRESS NOTES
"Daily Note     Today's date: 2025  Patient name: Sylvain Choi  : 1980  MRN: [id number]  Referring provider: Davin Arias PA*  Dx:   Encounter Diagnosis     ICD-10-CM    1. Status post left rotator cuff repair  Z98.890       2. Traumatic complete tear of left rotator cuff, initial encounter  S46.012A       3. Acute pain of left shoulder  M25.512                      Subjective: Pt states he is slowly noticing that things like putting his socks on are getting easier. He woke up with achy pain at about 4a but thinks he was laying in an awkward position. Doing better now than when he first woke up.       Objective: See treatment diary below      Assessment: Tolerated treatment well. Patient demonstrated fatigue post treatment and exhibited good technique with therapeutic exercises. Pt had mild achy pain w/ PROM into flexion which resolves when out of that position.       Plan: Continue per plan of care.      Precautions: RC repair protocol in op notes (attached to chart)  Dx: LEFT RC repair and subacromial decompression 24    Manuals 1/7 1/15 1/17 1/24 1/27        Shoulder PROM 10 mins 15 mins 12 mins 12 mins 12 mins                                               Neuro Re-Ed             Pendulums HEP 2x10 (dead hang variation) 2x10 (dead hang) 3x10\" 4x10\"        Seated scap retraction HEP 2x10 2x10x5\" 3x10x5\"         Standing scap ret w/ arms at side    2x10x5\" 3x10x5\"        Seated c/s retraction    1x10         Self upper trap stretch on L   2x20\"ea 2x20\" ea 2x20\" ea        Elbow AROM HEP 3x10 3x10 1# 2x10 1# 2x10        Gripping HEP R digigrip 2x10 G digigrip 2x10  G digigrip     1x0 elbow @ 90    1x10 elbow @ 0                                               Ther Ex             Wrist ext and flex    1# 2x10 ea movement 1# 2x10 ea movement                                                                                                   Ther Activity                                     "   Gait Training                                       Modalities

## 2025-02-03 ENCOUNTER — OFFICE VISIT (OUTPATIENT)
Dept: PHYSICAL THERAPY | Facility: CLINIC | Age: 45
End: 2025-02-03
Payer: COMMERCIAL

## 2025-02-03 DIAGNOSIS — S46.012A TRAUMATIC COMPLETE TEAR OF LEFT ROTATOR CUFF, INITIAL ENCOUNTER: ICD-10-CM

## 2025-02-03 DIAGNOSIS — M25.512 ACUTE PAIN OF LEFT SHOULDER: ICD-10-CM

## 2025-02-03 DIAGNOSIS — Z98.890 STATUS POST LEFT ROTATOR CUFF REPAIR: Primary | ICD-10-CM

## 2025-02-03 PROCEDURE — 97112 NEUROMUSCULAR REEDUCATION: CPT

## 2025-02-03 PROCEDURE — 97110 THERAPEUTIC EXERCISES: CPT

## 2025-02-03 PROCEDURE — 97140 MANUAL THERAPY 1/> REGIONS: CPT

## 2025-02-03 NOTE — PROGRESS NOTES
"Daily Note     Today's date: 2/3/2025  Patient name: Sylvain Choi  : 1980  MRN: [id number]  Referring provider: Davin Arias PA*  Dx:   Encounter Diagnosis     ICD-10-CM    1. Status post left rotator cuff repair  Z98.890       2. Traumatic complete tear of left rotator cuff, initial encounter  S46.012A       3. Acute pain of left shoulder  M25.512                      Subjective: Pt states that his shoulder still gets achy when laying on his back.      Objective: See treatment diary below      Assessment: Tolerated treatment well. Patient demonstrated fatigue post treatment. Achieved 115d PROM forward flexion today and 40d ER in neutral PROM for the first time today. Noted tightness and mild achy pain but improved once out of position.       Plan: Continue per plan of care.      Precautions: RC repair protocol in op notes (attached to chart)  Dx: LEFT RC repair and subacromial decompression 24    Manuals 1/7 1/15 1/17 1/24 1/27 2/3       Shoulder PROM 10 mins 15 mins 12 mins 12 mins 12 mins 12 mins                                              Neuro Re-Ed             Pendulums HEP 2x10 (dead hang variation) 2x10 (dead hang) 3x10\" 4x10\" 4x10\"        Seated scap retraction HEP 2x10 2x10x5\" 3x10x5\"  2x10x5\"       Standing scap ret w/ arms at side    2x10x5\" 3x10x5\" 2x10x5\"       Seated c/s retraction    1x10         Self upper trap stretch on L   2x20\"ea 2x20\" ea 2x20\" ea        Elbow AROM HEP 3x10 3x10 1# 2x10 1# 2x10 1# 3x10       Gripping HEP R digigrip 2x10 G digigrip 2x10  G digigrip     1x10 elbow @ 90    1x10 elbow @ 0 B digigrip     2x10 elbow @ 90    2x10 elbow @ 0                                              Ther Ex             Wrist ext and flex    1# 2x10 ea movement 1# 2x10 ea movement 1# 2x10 ea movement                                                                                                  Ther Activity                                       Gait Training                  "                      Modalities

## 2025-02-07 ENCOUNTER — OFFICE VISIT (OUTPATIENT)
Dept: PHYSICAL THERAPY | Facility: CLINIC | Age: 45
End: 2025-02-07
Payer: COMMERCIAL

## 2025-02-07 DIAGNOSIS — M25.512 ACUTE PAIN OF LEFT SHOULDER: ICD-10-CM

## 2025-02-07 DIAGNOSIS — Z98.890 STATUS POST LEFT ROTATOR CUFF REPAIR: Primary | ICD-10-CM

## 2025-02-07 DIAGNOSIS — S46.012A TRAUMATIC COMPLETE TEAR OF LEFT ROTATOR CUFF, INITIAL ENCOUNTER: ICD-10-CM

## 2025-02-07 PROCEDURE — 97140 MANUAL THERAPY 1/> REGIONS: CPT

## 2025-02-07 PROCEDURE — 97110 THERAPEUTIC EXERCISES: CPT

## 2025-02-07 PROCEDURE — 97112 NEUROMUSCULAR REEDUCATION: CPT

## 2025-02-07 NOTE — PROGRESS NOTES
"Daily Note     Today's date: 2025  Patient name: Sylvain Choi  : 1980  MRN: [id number]  Referring provider: Davin Arias PA*  Dx:   Encounter Diagnosis     ICD-10-CM    1. Status post left rotator cuff repair  Z98.890       2. Traumatic complete tear of left rotator cuff, initial encounter  S46.012A       3. Acute pain of left shoulder  M25.512                      Subjective: States his shoulder is more achy today. He did not take any medication prior to appt and he globally was more tired/a little under the weather yesterday and thinks that may be playing a role.      Objective: See treatment diary below      Assessment: Tolerated treatment well. Patient demonstrated fatigue post treatment and req cueing for position of his shoulder and form during new AAROM stretches.       Plan: Continue per plan of care.      Precautions: RC repair protocol in op notes (attached to chart)  Dx: LEFT RC repair and subacromial decompression 24    Manuals 1/7 1/15 1/17 1/24 1/27 2/3 2/7      Shoulder PROM 10 mins 15 mins 12 mins 12 mins 12 mins 12 mins 10 mins                                             Neuro Re-Ed             Pendulums HEP 2x10 (dead hang variation) 2x10 (dead hang) 3x10\" 4x10\" 4x10\"        Pulleys       2x10x5\"      Cane AAROM flexion and ER       2x5x5\" ea movement      Seated scap retraction HEP 2x10 2x10x5\" 3x10x5\"  2x10x5\"       Standing scap ret w/ arms at side    2x10x5\" 3x10x5\" 2x10x5\"       Seated c/s retraction    1x10         Self upper trap stretch on L   2x20\"ea 2x20\" ea 2x20\" ea        Elbow AROM HEP 3x10 3x10 1# 2x10 1# 2x10 1# 3x10 2# 2x10      Gripping HEP R digigrip 2x10 G digigrip 2x10  G digigrip     1x10 elbow @ 90    1x10 elbow @ 0 B digigrip     2x10 elbow @ 90    2x10 elbow @ 0                                              Ther Ex             Wrist ext and flex    1# 2x10 ea movement 1# 2x10 ea movement 1# 2x10 ea movement       Shoulder ER iso @ 0d       1x10x5\" "                                                                                    Ther Activity                                       Gait Training                                       Modalities

## 2025-02-10 ENCOUNTER — OFFICE VISIT (OUTPATIENT)
Dept: PHYSICAL THERAPY | Facility: CLINIC | Age: 45
End: 2025-02-10
Payer: COMMERCIAL

## 2025-02-10 DIAGNOSIS — M25.512 ACUTE PAIN OF LEFT SHOULDER: ICD-10-CM

## 2025-02-10 DIAGNOSIS — Z98.890 STATUS POST LEFT ROTATOR CUFF REPAIR: Primary | ICD-10-CM

## 2025-02-10 DIAGNOSIS — S46.012A TRAUMATIC COMPLETE TEAR OF LEFT ROTATOR CUFF, INITIAL ENCOUNTER: ICD-10-CM

## 2025-02-10 PROCEDURE — 97112 NEUROMUSCULAR REEDUCATION: CPT

## 2025-02-10 PROCEDURE — 97110 THERAPEUTIC EXERCISES: CPT

## 2025-02-10 PROCEDURE — 97140 MANUAL THERAPY 1/> REGIONS: CPT

## 2025-02-10 NOTE — PROGRESS NOTES
"Daily Note     Today's date: 2/10/2025  Patient name: Sylvain Choi  : 1980  MRN: [id number]  Referring provider: Davin Arias PA*  Dx:   Encounter Diagnosis     ICD-10-CM    1. Status post left rotator cuff repair  Z98.890       2. Traumatic complete tear of left rotator cuff, initial encounter  S46.012A       3. Acute pain of left shoulder  M25.512           Start Time: 0800  Stop Time: 08  Total time in clinic (min): 38 minutes    Subjective: Pt states his shoulder was a little more achy Friday but settled back to baseline the rest of the weekend. Feels tight and sore today because he had to shovel out the driveway and cars yesterday.       Objective: See treatment diary below      Assessment: Tolerated treatment well. Patient demonstrated fatigue post treatment and exhibited good technique with therapeutic exercises. Achieved about 120d PROM forward flexion and 45d ER in neutral.       Plan: Continue per plan of care.      Precautions: RC repair protocol in op notes (attached to chart)  Dx: LEFT RC repair and subacromial decompression 24    Manuals 1/7 1/15 1/17 1/24 1/27 2/3 2/7 2/10     Shoulder PROM 10 mins 15 mins 12 mins 12 mins 12 mins 12 mins 10 mins 10 mins                                            Neuro Re-Ed             Pendulums HEP 2x10 (dead hang variation) 2x10 (dead hang) 3x10\" 4x10\" 4x10\"        Pulleys       2x10x5\" 2x10x5\" flex and abd     Cane AAROM flexion and ER       2x5x5\" ea movement 1x10x5\" ea movement     Seated scap retraction HEP 2x10 2x10x5\" 3x10x5\"  2x10x5\"       Standing scap ret w/ arms at side    2x10x5\" 3x10x5\" 2x10x5\"       Seated c/s retraction    1x10         Self upper trap stretch on L   2x20\"ea 2x20\" ea 2x20\" ea        Elbow AROM HEP 3x10 3x10 1# 2x10 1# 2x10 1# 3x10 2# 2x10 2# 2x10    3# 1x10     Gripping HEP R digigrip 2x10 G digigrip 2x10  G digigrip     1x10 elbow @ 90    1x10 elbow @ 0 B digigrip     2x10 elbow @ 90    2x10 elbow @ 0         " "                                     Ther Ex             Wrist ext and flex    1# 2x10 ea movement 1# 2x10 ea movement 1# 2x10 ea movement       Shoulder ER iso @ 0d       1x10x5\" 2x10x5\"     Shoulder IR iso @ 0d        2x10x5\"                                                                      Ther Activity                                       Gait Training                                       Modalities                                                        "

## 2025-02-14 ENCOUNTER — APPOINTMENT (OUTPATIENT)
Dept: PHYSICAL THERAPY | Facility: CLINIC | Age: 45
End: 2025-02-14
Payer: COMMERCIAL

## 2025-02-17 ENCOUNTER — OFFICE VISIT (OUTPATIENT)
Dept: PHYSICAL THERAPY | Facility: CLINIC | Age: 45
End: 2025-02-17
Payer: COMMERCIAL

## 2025-02-17 DIAGNOSIS — S46.012A TRAUMATIC COMPLETE TEAR OF LEFT ROTATOR CUFF, INITIAL ENCOUNTER: ICD-10-CM

## 2025-02-17 DIAGNOSIS — Z98.890 STATUS POST LEFT ROTATOR CUFF REPAIR: Primary | ICD-10-CM

## 2025-02-17 DIAGNOSIS — M25.512 ACUTE PAIN OF LEFT SHOULDER: ICD-10-CM

## 2025-02-17 PROCEDURE — 97110 THERAPEUTIC EXERCISES: CPT

## 2025-02-17 PROCEDURE — 97112 NEUROMUSCULAR REEDUCATION: CPT

## 2025-02-17 NOTE — PROGRESS NOTES
"Daily Note     Today's date: 2025  Patient name: Sylvain Choi  : 1980  MRN: [id number]  Referring provider: Davin Arias PA*  Dx:   Encounter Diagnosis     ICD-10-CM    1. Status post left rotator cuff repair  Z98.890       2. Traumatic complete tear of left rotator cuff, initial encounter  S46.012A       3. Acute pain of left shoulder  M25.512                      Subjective: Pt states his shoulder feels stiff and achy today. Laying flat on his back has felt better this past weekend w/out waking up with achy pain as frequently.       Objective: See treatment diary below      Assessment: Tolerated treatment well. Patient demonstrated fatigue post treatment. Emphasized importance of doing HEP to progress ROM. Discussed what is normal to feel through these progressions at this point post operatively and that the exercises prescribed do not put his repair at risk because they follow a protocol. Pt stated understanding.       Plan: Continue per plan of care.      Precautions: RC repair protocol in op notes (attached to chart)  Dx: LEFT RC repair and subacromial decompression 24    Manuals 1/7 1/15 1/17 1/24 1/27 2/3 2/7 2/10 2/17    Shoulder PROM 10 mins 15 mins 12 mins 12 mins 12 mins 12 mins 10 mins 10 mins 12 mins                                           Neuro Re-Ed             Pendulums HEP 2x10 (dead hang variation) 2x10 (dead hang) 3x10\" 4x10\" 4x10\"        Pulleys       2x10x5\" 2x10x5\" flex  2x10x5\" flex Add abd   Cane AAROM flexion (standing) and ER (supine)       2x5x5\" ea movement 1x10x5\" ea movement 1x15x5\" ea movement    Seated scap retraction HEP 2x10 2x10x5\" 3x10x5\"  2x10x5\"       Standing scap ret w/ arms at side    2x10x5\" 3x10x5\" 2x10x5\"       Seated c/s retraction    1x10         Self upper trap stretch on L   2x20\"ea 2x20\" ea 2x20\" ea        Elbow AROM HEP 3x10 3x10 1# 2x10 1# 2x10 1# 3x10 2# 2x10 2# 2x10    3# 1x10 2# 1x10    3# 2x10    Gripping HEP R digigrip 2x10 G " "digigrip 2x10  G digigrip     1x10 elbow @ 90    1x10 elbow @ 0 B digigrip     2x10 elbow @ 90    2x10 elbow @ 0       Wall slides         1x10                              Ther Ex             Wrist ext and flex    1# 2x10 ea movement 1# 2x10 ea movement 1# 2x10 ea movement       Shoulder ER iso @ 0d       1x10x5\" 2x10x5\" 2x10x5\"    Shoulder IR iso @ 0d        2x10x5\" 2x10x5\"    TB neutral ER walk out             TB neutral IR walk out                                                    Ther Activity                                       Gait Training                                       Modalities                                                          "

## 2025-02-21 ENCOUNTER — OFFICE VISIT (OUTPATIENT)
Dept: PHYSICAL THERAPY | Facility: CLINIC | Age: 45
End: 2025-02-21
Payer: COMMERCIAL

## 2025-02-21 DIAGNOSIS — M25.512 ACUTE PAIN OF LEFT SHOULDER: ICD-10-CM

## 2025-02-21 DIAGNOSIS — Z98.890 STATUS POST LEFT ROTATOR CUFF REPAIR: Primary | ICD-10-CM

## 2025-02-21 DIAGNOSIS — S46.012A TRAUMATIC COMPLETE TEAR OF LEFT ROTATOR CUFF, INITIAL ENCOUNTER: ICD-10-CM

## 2025-02-21 PROCEDURE — 97110 THERAPEUTIC EXERCISES: CPT

## 2025-02-21 PROCEDURE — 97112 NEUROMUSCULAR REEDUCATION: CPT

## 2025-02-21 NOTE — PROGRESS NOTES
"Daily Note     Today's date: 2025  Patient name: Sylvain Choi  : 1980  MRN: [id number]  Referring provider: Davin Arias PA*  Dx:   Encounter Diagnosis     ICD-10-CM    1. Status post left rotator cuff repair  Z98.890       2. Traumatic complete tear of left rotator cuff, initial encounter  S46.012A       3. Acute pain of left shoulder  M25.512             Start Time: 0800  Stop Time: 0845  Total time in clinic (min): 45 minutes    Subjective: Patient reports, the pain has been improving. Notes still having stiffness.     Objective: See treatment diary below    Assessment: Tolerated treatment well. Patient demonstrated fatigue post treatment. Patient demonstrates limited motion secondary to capsular tightness. PROM did improve following manual therapy. Continue to follow protocol.     Plan: Continue per plan of care.      Precautions: RC repair protocol in op notes (attached to chart)  Dx: LEFT RC repair and subacromial decompression 24    Manuals 1/7 1/15 1/17 1/24 1/27 2/3 2/7 2/10 2/17 2/21   Shoulder PROM 10 mins 15 mins 12 mins 12 mins 12 mins 12 mins 10 mins 10 mins 12 mins 12 min                                          Neuro Re-Ed             Pendulums HEP 2x10 (dead hang variation) 2x10 (dead hang) 3x10\" 4x10\" 4x10\"        Pulleys       2x10x5\" 2x10x5\" flex  2x10x5\" flex Add abd   Cane AAROM flexion (standing) and ER (supine)       2x5x5\" ea movement 1x10x5\" ea movement 1x15x5\" ea movement 1x15x5\" ea  movement   Seated scap retraction HEP 2x10 2x10x5\" 3x10x5\"  2x10x5\"       Standing scap ret w/ arms at side    2x10x5\" 3x10x5\" 2x10x5\"       Seated c/s retraction    1x10         Self upper trap stretch on L   2x20\"ea 2x20\" ea 2x20\" ea        Elbow AROM HEP 3x10 3x10 1# 2x10 1# 2x10 1# 3x10 2# 2x10 2# 2x10    3# 1x10 2# 1x10    3# 2x10 3#  3x10   Gripping HEP R digigrip 2x10 G digigrip 2x10  G digigrip     1x10 elbow @ 90    1x10 elbow @ 0 B digigrip     2x10 elbow @ 90    2x10 " "elbow @ 0       Wall slides         1x10 1x10                             Ther Ex             Wrist ext and flex    1# 2x10 ea movement 1# 2x10 ea movement 1# 2x10 ea movement       Shoulder ER iso @ 0d       1x10x5\" 2x10x5\" 2x10x5\" 2x10x5\"   Shoulder IR iso @ 0d        2x10x5\" 2x10x5\" 2x10x5\"   TB neutral ER walk out             TB neutral IR walk out                                                    Ther Activity                                       Gait Training                                       Modalities                                          "

## 2025-02-24 ENCOUNTER — OFFICE VISIT (OUTPATIENT)
Dept: PHYSICAL THERAPY | Facility: CLINIC | Age: 45
End: 2025-02-24
Payer: COMMERCIAL

## 2025-02-24 DIAGNOSIS — M25.512 ACUTE PAIN OF LEFT SHOULDER: ICD-10-CM

## 2025-02-24 DIAGNOSIS — Z98.890 STATUS POST LEFT ROTATOR CUFF REPAIR: Primary | ICD-10-CM

## 2025-02-24 DIAGNOSIS — S46.012A TRAUMATIC COMPLETE TEAR OF LEFT ROTATOR CUFF, INITIAL ENCOUNTER: ICD-10-CM

## 2025-02-24 PROCEDURE — 97110 THERAPEUTIC EXERCISES: CPT

## 2025-02-24 PROCEDURE — 97112 NEUROMUSCULAR REEDUCATION: CPT

## 2025-02-28 ENCOUNTER — EVALUATION (OUTPATIENT)
Dept: PHYSICAL THERAPY | Facility: CLINIC | Age: 45
End: 2025-02-28
Payer: COMMERCIAL

## 2025-02-28 DIAGNOSIS — M25.512 ACUTE PAIN OF LEFT SHOULDER: ICD-10-CM

## 2025-02-28 DIAGNOSIS — Z98.890 STATUS POST LEFT ROTATOR CUFF REPAIR: Primary | ICD-10-CM

## 2025-02-28 DIAGNOSIS — S46.012A TRAUMATIC COMPLETE TEAR OF LEFT ROTATOR CUFF, INITIAL ENCOUNTER: ICD-10-CM

## 2025-02-28 PROCEDURE — 97112 NEUROMUSCULAR REEDUCATION: CPT

## 2025-02-28 PROCEDURE — 97164 PT RE-EVAL EST PLAN CARE: CPT

## 2025-02-28 NOTE — PROGRESS NOTES
PT Re-Evaluation     Today's date: 2025  Patient name: Sylvain Choi  : 1980  MRN: [id number]  Referring provider: Davin Arias PA*  Dx:   Encounter Diagnosis     ICD-10-CM    1. Status post left rotator cuff repair  Z98.890       2. Traumatic complete tear of left rotator cuff, initial encounter  S46.012A       3. Acute pain of left shoulder  M25.512                        Assessment  Impairments: abnormal coordination, abnormal or restricted ROM, activity intolerance, impaired physical strength, lacks appropriate home exercise program, pain with function, unable to perform ADL, participation limitations, activity limitations and endurance  Symptom irritability: moderate    Assessment details: Pt is 8 weeks 3 days post op RC repair on 24 performed by Dr. Lozano. Pt has been compliant with PT attendance and has moderate compliance with home exercise program and stretches. Impairments at this time include decreased FF, abd, ER, and IR ROM, decreased global shoulder strength, decreased UE stability in both OKC and CKC positions, decreased scapular NM control and posterior chain strength. These deficits cont to limit patients ability to do ADL's of dressing, washing, reaching to the side, OH, or behind his back, lifting his kids, lifting objects OH into cabinets, or doing any recreational activities. Continued skilled PT is required at this time to address deficits and progress through post op protocol safely.       Understanding of Dx/Px/POC: good     Prognosis: good    Goals  STG's:  1. In 4 weeks, pt will have 90d of flexion, 35d of ER, and 60d abd (100% met)  2 In 4 weeks, pt will be sleeping 3-4 hrs w/out interruption by pain. (100% met)  3. In 4 weeks, pt will tolerate walking 30 mins in sling w/out sh pain. (100% met)  4. In 6 weeks, pt will tolerate being out of sling for duration of the day. (100% met)    LTG's:  1. In 12 weeks, pt will restore full AROM LUCIANO. (50% met)  2. In 12  weeks, pt will demo 4-/5 MMT. (50% met)  3. In 16 weeks, pt will demo 4/5 MMT.(30% met)  4. In 16 weeks, pt will tolerate lifting 15# at their side. (20% met)  5. In 20 weeks, pt will tolerate lifting 25# at side and at least 5# OH. (0% met)    Plan  Patient would benefit from: skilled physical therapy  Planned modality interventions: low level laser therapy    Planned therapy interventions: joint mobilization, manual therapy, neuromuscular re-education, patient/caregiver education, self care, strengthening, therapeutic activities, therapeutic exercise, stretching, therapeutic training, home exercise program, graded exercise, graded activity, functional ROM exercises, flexibility, coordination, activity modification and ADL retraining    Frequency: 2x week  Duration in weeks: 12  Plan of Care beginning date: 2025  Plan of Care expiration date: 2025  Treatment plan discussed with: patient        Subjective Evaluation    History of Present Illness  Date of onset: 10/31/2024  Date of surgery: 2024  Mechanism of injury: surgery  Mechanism of injury: Pt is 8 weeks 3 days s/p RC repair on 24 performed by Dr. Lozano. He continues to have achy pain present when waking up and if he moves the arm too quickly to the side, OH, or behind his back. Pain at worst is reaching 5/10 but will be 0/10 if resting and not using it. He is not having as much pain while sleeping but does wake up achy if he rolled onto that side. He is not having issues while working but still is very limited with daily activities around the house, with self care, and taking care of his children.     Quality of life: good    Patient Goals  Patient goals for therapy: decreased edema, decreased pain, increased motion, return to work, return to sport/leisure activities, independence with ADLs/IADLs and increased strength  Patient goal: Play with his children without pain  Pain  Current pain ratin  At best pain ratin  At worst pain  ratin  Quality: dull ache and throbbing  Relieving factors: medications, rest, support and ice  Aggravating factors: eating, sitting, standing, walking, lifting, keyboarding and overhead activity  Progression: no change      Objective     Postural Observations    Additional Postural Observation Details  Forward head and rounded shoulder position in sitting.     Observations     Right Shoulder  Positive for edema and incision.       Palpation   Left   No palpable tenderness to the biceps.   Tenderness of the anterior deltoid, biceps, infraspinatus, supraspinatus    Tenderness     Left Shoulder   Tenderness in the infraspinatus tendon, subacromial bursa and supraspinatus tendon.     Cervical/Thoracic Screen   Cervical range of motion within normal limits    Active Range of Motion     Right Shoulder   Normal active range of motion    Additional Active Range of Motion Details  Flexion: 90d w/ pain  External rotation 0d: 35  Abduction: 80d w/ pain    Passive Range of Motion   Left Shoulder   Flexion: 110 degrees with pain  External rotation 0°: 45 degrees with pain  Abduction: 90d w/ pain    Right Shoulder   Normal passive range of motion    Strength/Myotome Testing     Right Shoulder   Normal muscle strength    Additional Strength Details  No MMT performed on L d/t post op status.     Tests     Additional Tests Details  Special testing held d/t post op status.

## 2025-02-28 NOTE — PROGRESS NOTES
"Daily Note     Today's date: 2025  Patient name: Sylvain Choi  : 1980  MRN: [id number]  Referring provider: Davin Arias PA*  Dx:   Encounter Diagnosis     ICD-10-CM    1. Status post left rotator cuff repair  Z98.890       2. Traumatic complete tear of left rotator cuff, initial encounter  S46.012A       3. Acute pain of left shoulder  M25.512           Start Time: 0800  Stop Time: 0845  Total time in clinic (min): 45 minutes    Subjective: Pt is achy this morning and feels tight. He is apprehension stretching into farther because he doesn't want to hurt the shoulder.       Objective: See treatment diary below      Assessment: Tolerated treatment well. Patient demonstrated fatigue post treatment      Plan: Continue per plan of care.      Precautions: RC repair protocol in op notes (attached to chart)  Dx: LEFT RC repair and subacromial decompression 24    Manuals 2/24 2/28   1/27 2/3 2/7 2/10 2/17 2/21   Shoulder PROM 12 min 10 mins   12 mins 12 mins 10 mins 10 mins 12 mins 12 min                                          Neuro Re-Ed             Pendulums     4x10\" 4x10\"        Pulleys 2x15 flex and abd 2x15 flex and abd     2x10x5\" 2x10x5\" flex  2x10x5\" flex Add abd   Cane AAROM flexion (standing) and ER (supine) 1x15 flexion 1x15 flex and ER     2x5x5\" ea movement 1x10x5\" ea movement 1x15x5\" ea movement 1x15x5\" ea  movement   Elbow AROM 3# 3x10    1# 2x10 1# 3x10 2# 2x10 2# 2x10    3# 1x10 2# 1x10    3# 2x10 3#  3x10   Wall slides 3x5 2x10       1x10 1x10                             Ther Ex             Shoulder ER iso @ 0d 2x10x5\"      1x10x5\" 2x10x5\" 2x10x5\" 2x10x5\"   Shoulder IR iso @ 0d 2x10x5\"       2x10x5\" 2x10x5\" 2x10x5\"   TB neutral ER walk out 2x5 laps RTB 3x8 laps           TB neutral IR walk out 2x5 laps RTB 3x8 laps                                                  Ther Activity                                       Gait Training                                     "   Modalities

## 2025-03-03 ENCOUNTER — APPOINTMENT (OUTPATIENT)
Dept: PHYSICAL THERAPY | Facility: CLINIC | Age: 45
End: 2025-03-03
Payer: COMMERCIAL

## 2025-03-07 ENCOUNTER — OFFICE VISIT (OUTPATIENT)
Dept: PHYSICAL THERAPY | Facility: CLINIC | Age: 45
End: 2025-03-07
Payer: COMMERCIAL

## 2025-03-07 DIAGNOSIS — Z98.890 STATUS POST LEFT ROTATOR CUFF REPAIR: Primary | ICD-10-CM

## 2025-03-07 DIAGNOSIS — S46.012A TRAUMATIC COMPLETE TEAR OF LEFT ROTATOR CUFF, INITIAL ENCOUNTER: ICD-10-CM

## 2025-03-07 DIAGNOSIS — M25.512 ACUTE PAIN OF LEFT SHOULDER: ICD-10-CM

## 2025-03-07 PROCEDURE — 97110 THERAPEUTIC EXERCISES: CPT

## 2025-03-07 PROCEDURE — 97112 NEUROMUSCULAR REEDUCATION: CPT

## 2025-03-07 NOTE — PROGRESS NOTES
"Daily Note     Today's date: 3/7/2025  Patient name: Sylvain Choi  : 1980  MRN: [id number]  Referring provider: Davin Arias PA*  Dx:   Encounter Diagnosis     ICD-10-CM    1. Status post left rotator cuff repair  Z98.890       2. Traumatic complete tear of left rotator cuff, initial encounter  S46.012A       3. Acute pain of left shoulder  M25.512                      Subjective: Pt states that pain is improving and having less frequent instances of achy pain and radiating pain. States he has not done HEP as frequently as he should but slowly improving.      Objective: See treatment diary below      Assessment: Tolerated treatment well. Patient demonstrated fatigue post treatment. Cueing req to increase scapular NM control and dec rounded shoulder posture. Cont emphasis on ROM, posterior chain strength, and RC activation/strength is req to progress towards all LTG's.      Plan: Continue per plan of care.      Precautions: RC repair protocol in op notes (attached to chart)  Dx: LEFT RC repair and subacromial decompression 24    Manuals 2/24 2/28 3/7  1/27 2/3 2/7 2/10 2/17 2/21   Shoulder PROM 12 min 10 mins 10 mins  12 mins 12 mins 10 mins 10 mins 12 mins 12 min                                          Neuro Re-Ed             Pendulums     4x10\" 4x10\"        Pulleys 2x15 flex and abd 2x15 flex and abd 2x15 flex and abd    2x10x5\" 2x10x5\" flex  2x10x5\" flex Add abd   Cane AAROM flexion (standing) and ER (supine) 1x15 flexion 1x15 flex and ER 1x15 flex, ecc lowering    2x5x5\" ea movement 1x10x5\" ea movement 1x15x5\" ea movement 1x15x5\" ea  movement   Elbow AROM 3# 3x10    1# 2x10 1# 3x10 2# 2x10 2# 2x10    3# 1x10 2# 1x10    3# 2x10 3#  3x10   Wall slides 3x5 2x10 2x10      1x10 1x10                             Ther Ex             Shoulder ER iso @ 0d 2x10x5\"      1x10x5\" 2x10x5\" 2x10x5\" 2x10x5\"   Shoulder IR iso @ 0d 2x10x5\"       2x10x5\" 2x10x5\" 2x10x5\"   TB neutral ER walk out 2x5 laps RTB " 3x8 laps           TB neutral IR walk out 2x5 laps RTB 3x8 laps           Neutral sh ER   RTB 2x10          Neutral sh IR   RTB 2x10                       Ther Activity                                       Gait Training                                       Modalities

## 2025-03-10 ENCOUNTER — OFFICE VISIT (OUTPATIENT)
Dept: PHYSICAL THERAPY | Facility: CLINIC | Age: 45
End: 2025-03-10
Payer: COMMERCIAL

## 2025-03-10 DIAGNOSIS — M25.512 ACUTE PAIN OF LEFT SHOULDER: ICD-10-CM

## 2025-03-10 DIAGNOSIS — Z98.890 STATUS POST LEFT ROTATOR CUFF REPAIR: Primary | ICD-10-CM

## 2025-03-10 DIAGNOSIS — S46.012A TRAUMATIC COMPLETE TEAR OF LEFT ROTATOR CUFF, INITIAL ENCOUNTER: ICD-10-CM

## 2025-03-10 PROCEDURE — 97112 NEUROMUSCULAR REEDUCATION: CPT

## 2025-03-10 PROCEDURE — 97140 MANUAL THERAPY 1/> REGIONS: CPT

## 2025-03-10 NOTE — PROGRESS NOTES
"Daily Note     Today's date: 3/10/2025  Patient name: Sylvain Choi  : 1980  MRN: [id number]  Referring provider: Davin Arias PA*  Dx:   Encounter Diagnosis     ICD-10-CM    1. Status post left rotator cuff repair  Z98.890       2. Traumatic complete tear of left rotator cuff, initial encounter  S46.012A       3. Acute pain of left shoulder  M25.512           Start Time: 0805  Stop Time: 0845  Total time in clinic (min): 40 minutes    Subjective: Patient reports feeling sore after last visit but nothing that lingered very long.     Objective: See treatment diary below    Assessment: Tolerated treatment well. Patient demonstrated fatigue post treatment. Most limited with ER and Abd PROM secondary to capsular tightness. Tactile cueing to improve posture and form during TB ER/IR. Cont emphasis on ROM, posterior chain strength, and RC activation/strength is req to progress towards all LTG's.    Plan: Continue per plan of care.      Precautions: RC repair protocol in op notes (attached to chart)  Dx: LEFT RC repair and subacromial decompression 24    Manuals 2/24 2/28 3/7 3/10         Shoulder PROM 12 min 10 mins 10 mins 10 mins                                                Neuro Re-Ed             Pendulums             Pulleys 2x15 flex and abd 2x15 flex and abd 2x15 flex and abd 2x15 flex and abd         Cane AAROM flexion (standing) and ER (supine) 1x15 flexion 1x15 flex and ER 1x15 flex, ecc lowering 1x15 flex, ecc lowering         Elbow AROM 3# 3x10            Wall slides 3x5 2x10 2x10 2x10                                   Ther Ex             Shoulder ER iso @ 0d 2x10x5\"            Shoulder IR iso @ 0d 2x10x5\"            TB neutral ER walk out 2x5 laps RTB 3x8 laps           TB neutral IR walk out 2x5 laps RTB 3x8 laps           Neutral sh ER   RTB 2x10 RTB 2x10         Neutral sh IR   RTB 2x10 RTB 2x10                      Ther Activity                                       Gait Training   "                                     Modalities

## 2025-03-14 ENCOUNTER — OFFICE VISIT (OUTPATIENT)
Dept: PHYSICAL THERAPY | Facility: CLINIC | Age: 45
End: 2025-03-14
Payer: COMMERCIAL

## 2025-03-14 DIAGNOSIS — Z98.890 STATUS POST LEFT ROTATOR CUFF REPAIR: Primary | ICD-10-CM

## 2025-03-14 DIAGNOSIS — S46.012A TRAUMATIC COMPLETE TEAR OF LEFT ROTATOR CUFF, INITIAL ENCOUNTER: ICD-10-CM

## 2025-03-14 DIAGNOSIS — M25.512 ACUTE PAIN OF LEFT SHOULDER: ICD-10-CM

## 2025-03-14 PROCEDURE — 97110 THERAPEUTIC EXERCISES: CPT

## 2025-03-14 PROCEDURE — 97112 NEUROMUSCULAR REEDUCATION: CPT

## 2025-03-14 NOTE — PROGRESS NOTES
Daily Note     Today's date: 3/14/2025  Patient name: Sylvain Choi  : 1980  MRN: [id number]  Referring provider: Davin Arias PA*  Dx:   Encounter Diagnosis     ICD-10-CM    1. Status post left rotator cuff repair  Z98.890       2. Traumatic complete tear of left rotator cuff, initial encounter  S46.012A       3. Acute pain of left shoulder  M25.512                      Subjective: Pt states he woke up last night for the first time in a few weeks with a very intense pain at the shoulder. He doesn't know what triggered it but he moved positions and it resolved enough to fall back asleep. Has not had any pain this morning now.       Objective: See treatment diary below      Assessment: Tolerated treatment well. Session consisted of progressing AROM as tolerated for scaption and abduction to 90d. Added progression of resisted posterior chain strength and cueing was req to improve activation and control of mid and lower trap.      Plan: Continue per plan of care.      Precautions: RC repair protocol in op notes (attached to chart)  Dx: LEFT RC repair and subacromial decompression 24    Manuals 2/24 2/28 3/7 3/10 3/14        Shoulder PROM 12 min 10 mins 10 mins 10 mins 10 mins                                               Neuro Re-Ed             Pendulums             Pulleys 2x15 flex and abd 2x15 flex and abd 2x15 flex and abd 2x15 flex and abd 2x15 flex and abd        Cane AAROM flexion (supine) and ER (supine) 1x15 flexion 1x15 flex and ER 1x15 flex, ecc lowering 1x15 flex, ecc lowering 1x15 flex and ER        Elbow AROM 3# 3x10            Wall slides 3x5 2x10 2x10 2x10 2x12        Scaption to 90d     3x5        Cane Abd     90d, ecc no cane  2x5        Ther Ex             Neutral sh ER   RTB 2x10 RTB 2x10 RTB 2x10        Neutral sh IR   RTB 2x10 RTB 2x10 RTB 3x10        TB row     GTB 2x12        TB sh ext     RTB 2x12                                  Ther Activity                                        Gait Training                                       Modalities

## 2025-03-18 ENCOUNTER — OFFICE VISIT (OUTPATIENT)
Dept: PHYSICAL THERAPY | Facility: CLINIC | Age: 45
End: 2025-03-18
Payer: COMMERCIAL

## 2025-03-18 DIAGNOSIS — M25.512 ACUTE PAIN OF LEFT SHOULDER: ICD-10-CM

## 2025-03-18 DIAGNOSIS — Z98.890 STATUS POST LEFT ROTATOR CUFF REPAIR: Primary | ICD-10-CM

## 2025-03-18 DIAGNOSIS — S46.012A TRAUMATIC COMPLETE TEAR OF LEFT ROTATOR CUFF, INITIAL ENCOUNTER: ICD-10-CM

## 2025-03-18 PROCEDURE — 97112 NEUROMUSCULAR REEDUCATION: CPT

## 2025-03-18 PROCEDURE — 97110 THERAPEUTIC EXERCISES: CPT

## 2025-03-18 NOTE — PROGRESS NOTES
Daily Note     Today's date: 3/18/2025  Patient name: Sylvain Choi  : 1980  MRN: [id number]  Referring provider: Davin Arias PA*  Dx:   Encounter Diagnosis     ICD-10-CM    1. Status post left rotator cuff repair  Z98.890       2. Traumatic complete tear of left rotator cuff, initial encounter  S46.012A       3. Acute pain of left shoulder  M25.512                      Subjective: Pt states that his shoulder cont to improve with how painful or achy it gets but stiffness into OH positions has continued.      Objective: See treatment diary below      Assessment: Tolerated treatment well. Session focused on shoulder flexion ROM and control. Pt had improved ability to reach OH after incorporating supine AAROM flexion and working on full range flexion. Pt noted achy pain present during these but tolerated inc volume of flexion.      Plan: Continue per plan of care.      Precautions: RC repair protocol in op notes (attached to chart)  Dx: LEFT RC repair and subacromial decompression 24    Manuals 2/24 2/28 3/7 3/10 3/14 3/18       Shoulder PROM 12 min 10 mins 10 mins 10 mins 10 mins 10 mins       GHJ mobs      Inf and post 2x10 ea                                 Neuro Re-Ed             Pendulums             Pulleys 2x15 flex and abd 2x15 flex and abd 2x15 flex and abd 2x15 flex and abd 2x15 flex and abd 2x15 flex and abd       Cane AAROM flexion (supine) and ER (supine) 1x15 ER 1x15 ER 1x15  ER 1x15 ER 1x15 ER 1x15 flexion in supine and ER       Wall slides 3x5 2x10 2x10 2x10 2x12 2x12       Scaption to 90d     3x5 3x6       Sh abd      90d, ecc no cane  2x5 90d 3x5       Sh FF full range      2x5       Ther Ex             Neutral sh ER   RTB 2x10 RTB 2x10 RTB 2x10 np       Neutral sh IR   RTB 2x10 RTB 2x10 RTB 3x10 np       TB row     GTB 2x12 np       TB sh ext     RTB 2x12 np                                 Ther Activity                                       Gait Training                                        Modalities

## 2025-03-21 ENCOUNTER — OFFICE VISIT (OUTPATIENT)
Dept: PHYSICAL THERAPY | Facility: CLINIC | Age: 45
End: 2025-03-21
Payer: COMMERCIAL

## 2025-03-21 DIAGNOSIS — Z98.890 STATUS POST LEFT ROTATOR CUFF REPAIR: Primary | ICD-10-CM

## 2025-03-21 DIAGNOSIS — S46.012A TRAUMATIC COMPLETE TEAR OF LEFT ROTATOR CUFF, INITIAL ENCOUNTER: ICD-10-CM

## 2025-03-21 DIAGNOSIS — M25.512 ACUTE PAIN OF LEFT SHOULDER: ICD-10-CM

## 2025-03-21 PROCEDURE — 97110 THERAPEUTIC EXERCISES: CPT

## 2025-03-21 PROCEDURE — 97112 NEUROMUSCULAR REEDUCATION: CPT

## 2025-03-21 NOTE — PROGRESS NOTES
Daily Note     Today's date: 3/21/2025  Patient name: Sylvain Choi  : 1980  MRN: [id number]  Referring provider: Davin Arias PA*  Dx:   Encounter Diagnosis     ICD-10-CM    1. Status post left rotator cuff repair  Z98.890       2. Traumatic complete tear of left rotator cuff, initial encounter  S46.012A       3. Acute pain of left shoulder  M25.512           Start Time: 0800  Stop Time: 0845  Total time in clinic (min): 45 minutes    Subjective: Patient reports minimal pain. Notes his AROM is limited more to the side vs forward.     Objective: See treatment diary below    Assessment: Patient demonstrates capsular L shoulder tightness during PROM limiting his AROM. Additionally, RTC and scapular strength is limited. Address these impairments with assigned TE below within the current surgical protocol. Encouraged patient to complete TE once daily for good carry over.     Plan: Continue per plan of care.      Precautions: RC repair protocol in op notes (attached to chart)  Dx: LEFT RC repair and subacromial decompression 24    Manuals 2/24 2/28 3/7 3/10 3/14 3/18 3/21      Shoulder PROM 12 min 10 mins 10 mins 10 mins 10 mins 10 mins 10 mins      GHJ mobs      Inf and post 2x10 ea                                 Neuro Re-Ed             Pendulums             Pulleys 2x15 flex and abd 2x15 flex and abd 2x15 flex and abd 2x15 flex and abd 2x15 flex and abd 2x15 flex and abd 2x15 flex and abd      Cane AAROM flexion (supine) and ER (supine) 1x15 ER 1x15 ER 1x15  ER 1x15 ER 1x15 ER 1x15 flexion in supine and ER 2x15 flexion in supine and ER      Wall slides 3x5 2x10 2x10 2x10 2x12 2x12 2x12      Scaption to 90d     3x5 3x6 3x6      Sh abd      90d, ecc no cane  2x5 90d 3x5 90d 3x5      Sh FF full range      2x5 2x5      Ther Ex             Neutral sh ER   RTB 2x10 RTB 2x10 RTB 2x10 np       Neutral sh IR   RTB 2x10 RTB 2x10 RTB 3x10 np       TB row     GTB 2x12 np       TB sh ext     RTB 2x12 np                                  Ther Activity                                       Gait Training                                       Modalities

## 2025-03-25 ENCOUNTER — OFFICE VISIT (OUTPATIENT)
Dept: PHYSICAL THERAPY | Facility: CLINIC | Age: 45
End: 2025-03-25
Payer: COMMERCIAL

## 2025-03-25 DIAGNOSIS — Z98.890 STATUS POST LEFT ROTATOR CUFF REPAIR: Primary | ICD-10-CM

## 2025-03-25 DIAGNOSIS — S46.012A TRAUMATIC COMPLETE TEAR OF LEFT ROTATOR CUFF, INITIAL ENCOUNTER: ICD-10-CM

## 2025-03-25 DIAGNOSIS — M25.512 ACUTE PAIN OF LEFT SHOULDER: ICD-10-CM

## 2025-03-25 PROCEDURE — 97112 NEUROMUSCULAR REEDUCATION: CPT

## 2025-03-25 PROCEDURE — 97140 MANUAL THERAPY 1/> REGIONS: CPT

## 2025-03-25 PROCEDURE — 97110 THERAPEUTIC EXERCISES: CPT

## 2025-03-25 NOTE — PROGRESS NOTES
Daily Note     Today's date: 3/25/2025  Patient name: Sylvain Choi  : 1980  MRN: [id number]  Referring provider: Davin Arias PA*  Dx:   Encounter Diagnosis     ICD-10-CM    1. Status post left rotator cuff repair  Z98.890       2. Traumatic complete tear of left rotator cuff, initial encounter  S46.012A       3. Acute pain of left shoulder  M25.512                      Subjective: Pt states that his his shoulder feels stiff and gets tired quickly but pain levels have been improved. He notes that he has not been consistent with doing his home program and knows that is why he feels stiff too.      Objective: See treatment diary below      Assessment: Tolerated treatment well. Patient  had sig fatigue with added resistance to tolerance for shoulder FF and abd AROM exercises. No increase in pain but form compensations were noted.      Plan: Continue per plan of care.      Precautions: RC repair protocol in op notes (attached to chart)  Dx: LEFT RC repair and subacromial decompression 24    Manuals 2/24 2/28 3/7 3/10 3/14 3/18 3/21 3/25     Shoulder PROM 12 min 10 mins 10 mins 10 mins 10 mins 10 mins 10 mins 10 mins     GHJ mobs      Inf and post 2x10 ea  Inf, post, ant  2x10 ea                               Neuro Re-Ed             Pendulums             Pulleys 2x15 flex and abd 2x15 flex and abd 2x15 flex and abd 2x15 flex and abd 2x15 flex and abd 2x15 flex and abd 2x15 flex and abd 2x15 flex and abd     Cane AAROM flexion (supine) and ER (supine) 1x15 ER 1x15 ER 1x15  ER 1x15 ER 1x15 ER 1x15 flexion in supine and ER 2x15 flexion in supine and ER 1x15 supine flex     Wall slides 3x5 2x10 2x10 2x10 2x12 2x12 2x12 1.5# 3x5     Scaption to 90d     3x5 3x6 3x6 1# 3x5     Sh abd      90d, ecc no cane  2x5 90d 3x5 90d 3x5 1# 3x5     Sh FF full range      2x5 2x5 2x5     Ther Ex             Neutral sh ER   RTB 2x10 RTB 2x10 RTB 2x10 np  RTB 3x10     Neutral sh IR   RTB 2x10 RTB 2x10 RTB 3x10 np        TB row     GTB 2x12 np       TB sh ext     RTB 2x12 np       S/L sh ER        1# 2x5                  Ther Activity                                       Gait Training                                       Modalities

## 2025-03-28 ENCOUNTER — APPOINTMENT (OUTPATIENT)
Dept: PHYSICAL THERAPY | Facility: CLINIC | Age: 45
End: 2025-03-28
Payer: COMMERCIAL

## 2025-04-01 ENCOUNTER — APPOINTMENT (OUTPATIENT)
Dept: PHYSICAL THERAPY | Facility: CLINIC | Age: 45
End: 2025-04-01
Payer: COMMERCIAL

## 2025-04-04 ENCOUNTER — OFFICE VISIT (OUTPATIENT)
Dept: PHYSICAL THERAPY | Facility: CLINIC | Age: 45
End: 2025-04-04
Payer: COMMERCIAL

## 2025-04-04 DIAGNOSIS — Z98.890 STATUS POST LEFT ROTATOR CUFF REPAIR: Primary | ICD-10-CM

## 2025-04-04 DIAGNOSIS — M25.512 ACUTE PAIN OF LEFT SHOULDER: ICD-10-CM

## 2025-04-04 DIAGNOSIS — S46.012A TRAUMATIC COMPLETE TEAR OF LEFT ROTATOR CUFF, INITIAL ENCOUNTER: ICD-10-CM

## 2025-04-04 PROCEDURE — 97110 THERAPEUTIC EXERCISES: CPT

## 2025-04-04 PROCEDURE — 97112 NEUROMUSCULAR REEDUCATION: CPT

## 2025-04-04 PROCEDURE — 97140 MANUAL THERAPY 1/> REGIONS: CPT

## 2025-04-04 NOTE — PROGRESS NOTES
Daily Note     Today's date: 2025  Patient name: Sylvain Choi  : 1980  MRN: [id number]  Referring provider: Davin Arias PA*  Dx:   Encounter Diagnosis     ICD-10-CM    1. Status post left rotator cuff repair  Z98.890       2. Traumatic complete tear of left rotator cuff, initial encounter  S46.012A       3. Acute pain of left shoulder  M25.512           Start Time: 0800  Stop Time: 0840  Total time in clinic (min): 40 minutes    Subjective: Patient reports, I'm able to reach a little higher. No complaints of pain. Just feels tight.     Objective: See treatment diary below    Assessment: Patient demonstrates slightly improved PROM following manual stretching. Cueing required to minimize UT compensation during AROM FF and ABD. Tactile cueing frequently to maintain good form during exercises.     Plan: Continue per plan of care.      Precautions: RC repair protocol in op notes (attached to chart)  Dx: LEFT RC repair and subacromial decompression 24    Manuals 2/24 2/28 3/7 3/10 3/14 3/18 3/21 3/25 4/4    Shoulder PROM 12 min 10 mins 10 mins 10 mins 10 mins 10 mins 10 mins 10 mins 10 mins    GHJ mobs      Inf and post 2x10 ea  Inf, post, ant  2x10 ea                               Neuro Re-Ed             Pendulums             Pulleys 2x15 flex and abd 2x15 flex and abd 2x15 flex and abd 2x15 flex and abd 2x15 flex and abd 2x15 flex and abd 2x15 flex and abd 2x15 flex and abd 2x15 flex and abd    Cane AAROM flexion (supine) and ER (supine) 1x15 ER 1x15 ER 1x15  ER 1x15 ER 1x15 ER 1x15 flexion in supine and ER 2x15 flexion in supine and ER 1x15 supine flex 1x15 supine flex    Wall slides 3x5 2x10 2x10 2x10 2x12 2x12 2x12 1.5# 3x5 1.5# 3x8    Scaption to 90d     3x5 3x6 3x6 1# 3x5 1# 3x8    Sh abd      90d, ecc no cane  2x5 90d 3x5 90d 3x5 1# 3x5 1# 3x8    Sh FF full range      2x5 2x5 2x5 3x8                 Ther Ex             Neutral sh ER   RTB 2x10 RTB 2x10 RTB 2x10 np  RTB 3x10 RTB  3x10     Neutral sh IR   RTB 2x10 RTB 2x10 RTB 3x10 np       TB row     GTB 2x12 np       TB sh ext     RTB 2x12 np       S/L sh ER        1# 2x5 1# 3x8                 Ther Activity                                       Gait Training                                       Modalities

## 2025-04-08 ENCOUNTER — OFFICE VISIT (OUTPATIENT)
Dept: PHYSICAL THERAPY | Facility: CLINIC | Age: 45
End: 2025-04-08
Payer: COMMERCIAL

## 2025-04-08 DIAGNOSIS — S46.012A TRAUMATIC COMPLETE TEAR OF LEFT ROTATOR CUFF, INITIAL ENCOUNTER: ICD-10-CM

## 2025-04-08 DIAGNOSIS — Z98.890 STATUS POST LEFT ROTATOR CUFF REPAIR: Primary | ICD-10-CM

## 2025-04-08 DIAGNOSIS — M25.512 ACUTE PAIN OF LEFT SHOULDER: ICD-10-CM

## 2025-04-08 PROCEDURE — 97110 THERAPEUTIC EXERCISES: CPT

## 2025-04-08 PROCEDURE — 97112 NEUROMUSCULAR REEDUCATION: CPT

## 2025-04-08 PROCEDURE — 97140 MANUAL THERAPY 1/> REGIONS: CPT

## 2025-04-08 NOTE — PROGRESS NOTES
"Daily Note     Today's date: 2025  Patient name: Sylvain Choi  : 1980  MRN: [id number]  Referring provider: Davin Arias PA*  Dx:   Encounter Diagnosis     ICD-10-CM    1. Status post left rotator cuff repair  Z98.890       2. Traumatic complete tear of left rotator cuff, initial encounter  S46.012A       3. Acute pain of left shoulder  M25.512           Start Time: 0802  Stop Time: 0845  Total time in clinic (min): 43 minutes    Subjective: Patient states, \"My motion is getting better. I only had one moment of pain over the weekend with a faster movement\".    Objective: See treatment diary below    Assessment: Patient continues to show steady progress with PROM and had no pain during stretching. He is making progress with strengthening and will benefit from skilled therapy to progress within surgical protocol.     Plan: Continue per plan of care.      Precautions: RC repair protocol in op notes (attached to chart)  Dx: LEFT RC repair and subacromial decompression 24    Manuals 2/24 2/28 3/7 3/10 3/14 3/18 3/21 3/25 4/4 4/8   Shoulder PROM 12 min 10 mins 10 mins 10 mins 10 mins 10 mins 10 mins 10 mins 10 mins 10 mins   GHJ mobs      Inf and post 2x10 ea  Inf, post, ant  2x10 ea                               Neuro Re-Ed             Pendulums             Pulleys 2x15 flex and abd 2x15 flex and abd 2x15 flex and abd 2x15 flex and abd 2x15 flex and abd 2x15 flex and abd 2x15 flex and abd 2x15 flex and abd 2x15 flex and abd 2x15 flex and abd   Cane AAROM flexion (supine) and ER (supine) 1x15 ER 1x15 ER 1x15  ER 1x15 ER 1x15 ER 1x15 flexion in supine and ER 2x15 flexion in supine and ER 1x15 supine flex 1x15 supine flex 1x15 supine flex   Wall slides 3x5 2x10 2x10 2x10 2x12 2x12 2x12 1.5# 3x5 1.5# 3x8 1.5#  3x10   Scaption to 90d     3x5 3x6 3x6 1# 3x5 1# 3x8 1#  3x10   Sh abd      90d, ecc no cane  2x5 90d 3x5 90d 3x5 1# 3x5 1# 3x8 1#  3x10   Sh FF full range      2x5 2x5 2x5 3x8 3x8          "       Ther Ex             Neutral sh ER   RTB 2x10 RTB 2x10 RTB 2x10 np  RTB 3x10 RTB  3x10 RTB  3x12   Neutral sh IR   RTB 2x10 RTB 2x10 RTB 3x10 np       TB row     GTB 2x12 np       TB sh ext     RTB 2x12 np       S/L sh ER        1# 2x5 1# 3x8 2#  3x8                Ther Activity                                       Gait Training                                       Modalities

## 2025-04-09 ENCOUNTER — OFFICE VISIT (OUTPATIENT)
Dept: PHYSICAL THERAPY | Facility: CLINIC | Age: 45
End: 2025-04-09
Payer: COMMERCIAL

## 2025-04-09 DIAGNOSIS — M25.512 ACUTE PAIN OF LEFT SHOULDER: ICD-10-CM

## 2025-04-09 DIAGNOSIS — S46.012A TRAUMATIC COMPLETE TEAR OF LEFT ROTATOR CUFF, INITIAL ENCOUNTER: ICD-10-CM

## 2025-04-09 DIAGNOSIS — Z98.890 STATUS POST LEFT ROTATOR CUFF REPAIR: Primary | ICD-10-CM

## 2025-04-09 PROCEDURE — 97140 MANUAL THERAPY 1/> REGIONS: CPT

## 2025-04-09 PROCEDURE — 97112 NEUROMUSCULAR REEDUCATION: CPT

## 2025-04-09 PROCEDURE — 97110 THERAPEUTIC EXERCISES: CPT

## 2025-04-09 NOTE — PROGRESS NOTES
"Daily Note     Today's date: 2025  Patient name: Sylvain Choi  : 1980  MRN: [id number]  Referring provider: Davin Arias PA*  Dx:   Encounter Diagnosis     ICD-10-CM    1. Status post left rotator cuff repair  Z98.890       2. Traumatic complete tear of left rotator cuff, initial encounter  S46.012A       3. Acute pain of left shoulder  M25.512           Start Time: 0800  Stop Time: 0845  Total time in clinic (min): 45 minutes    Subjective: Patient states, \"I felt fatigued following last visit. No pain though\".    Objective: See treatment diary below    Assessment: Patient continues to show steady progress with PROM and had no pain during stretching. He is making progress with strengthening and will benefit from skilled therapy to progress within surgical protocol.     Plan: Continue per plan of care.      Precautions: RC repair protocol in op notes (attached to chart)  Dx: LEFT RC repair and subacromial decompression 24    Manuals  4   Shoulder PROM 10 mins        10 mins 10 mins   GHJ mobs                                       Neuro Re-Ed             Pendulums             Pulleys 2x15 flex and abd        2x15 flex and abd 2x15 flex and abd   Cane AAROM flexion (supine) and ER (supine) 1x15 supine flex        1x15 supine flex 1x15 supine flex   Wall slides 1.5#  3x10        1.5# 3x8 1.5#  3x10   Scaption to 90d 2#  3x8        1# 3x8 1#  3x10   Sh abd  2#  3x8        1# 3x8 1#  3x10   Sh FF full range 3x10        3x8 3x8                Ther Ex             Neutral sh ER RTB  3x12        RTB  3x10 RTB  3x12   Neutral sh IR             TB row             TB sh ext             S/L sh ER 2#  3x10        1# 3x8 2#  3x8                Ther Activity                                       Gait Training                                       Modalities                                          "

## 2025-04-10 ENCOUNTER — OFFICE VISIT (OUTPATIENT)
Dept: INTERNAL MEDICINE CLINIC | Facility: CLINIC | Age: 45
End: 2025-04-10
Payer: COMMERCIAL

## 2025-04-10 VITALS
HEART RATE: 75 BPM | DIASTOLIC BLOOD PRESSURE: 60 MMHG | HEIGHT: 70 IN | OXYGEN SATURATION: 98 % | WEIGHT: 241.8 LBS | SYSTOLIC BLOOD PRESSURE: 100 MMHG | TEMPERATURE: 97.4 F | BODY MASS INDEX: 34.62 KG/M2

## 2025-04-10 DIAGNOSIS — J30.1 CHRONIC SEASONAL ALLERGIC RHINITIS DUE TO POLLEN: ICD-10-CM

## 2025-04-10 DIAGNOSIS — Z00.00 LABORATORY EXAM ORDERED AS PART OF ROUTINE GENERAL MEDICAL EXAMINATION: ICD-10-CM

## 2025-04-10 DIAGNOSIS — J06.9 VIRAL UPPER RESPIRATORY TRACT INFECTION: ICD-10-CM

## 2025-04-10 DIAGNOSIS — J30.89 ALLERGIC RHINITIS DUE TO HOUSE DUST MITE: ICD-10-CM

## 2025-04-10 DIAGNOSIS — Z00.00 ANNUAL PHYSICAL EXAM: Primary | ICD-10-CM

## 2025-04-10 DIAGNOSIS — Z13.220 SCREENING, LIPID: ICD-10-CM

## 2025-04-10 DIAGNOSIS — J30.81 ALLERGIC RHINITIS DUE TO CATS: ICD-10-CM

## 2025-04-10 DIAGNOSIS — J31.0 CHRONIC RHINITIS: ICD-10-CM

## 2025-04-10 PROCEDURE — 99396 PREV VISIT EST AGE 40-64: CPT | Performed by: INTERNAL MEDICINE

## 2025-04-10 RX ORDER — AZELASTINE 1 MG/ML
2 SPRAY, METERED NASAL DAILY
Qty: 30 ML | Refills: 11 | Status: SHIPPED | OUTPATIENT
Start: 2025-04-10 | End: 2026-04-10

## 2025-04-10 RX ORDER — MOMETASONE FUROATE MONOHYDRATE 50 UG/1
2 SPRAY, METERED NASAL DAILY
Qty: 17 G | Refills: 11 | Status: SHIPPED | OUTPATIENT
Start: 2025-04-10 | End: 2026-04-10

## 2025-04-10 NOTE — PATIENT INSTRUCTIONS
"Patient Education     Routine physical for adults   The Basics   Written by the doctors and editors at [facility]   What is a physical? -- A physical is a routine visit, or \"check-up,\" with your doctor. You might also hear it called a \"wellness visit\" or \"preventive visit.\"  During each visit, the doctor will:   Ask about your physical and mental health   Ask about your habits, behaviors, and lifestyle   Do an exam   Give you vaccines if needed   Talk to you about any medicines you take   Give advice about your health   Answer your questions  Getting regular check-ups is an important part of taking care of your health. It can help your doctor find and treat any problems you have. But it's also important for preventing health problems.  A routine physical is different from a \"sick visit.\" A sick visit is when you see a doctor because of a health concern or problem. Since physicals are scheduled ahead of time, you can think about what you want to ask the doctor.  How often should I get a physical? -- It depends on your age and health. In general, for people age 21 years and older:   If you are younger than 50 years, you might be able to get a physical every 3 years.   If you are 50 years or older, your doctor might recommend a physical every year.  If you have an ongoing health condition, like diabetes or high blood pressure, your doctor will probably want to see you more often.  What happens during a physical? -- In general, each visit will include:   Physical exam - The doctor or nurse will check your height, weight, heart rate, and blood pressure. They will also look at your eyes and ears. They will ask about how you are feeling and whether you have any symptoms that bother you.   Medicines - It's a good idea to bring a list of all the medicines you take to each doctor visit. Your doctor will talk to you about your medicines and answer any questions. Tell them if you are having any side effects that bother you. You " "should also tell them if you are having trouble paying for any of your medicines.   Habits and behaviors - This includes:   Your diet   Your exercise habits   Whether you smoke, drink alcohol, or use drugs   Whether you are sexually active   Whether you feel safe at home  Your doctor will talk to you about things you can do to improve your health and lower your risk of health problems. They will also offer help and support. For example, if you want to quit smoking, they can give you advice and might prescribe medicines. If you want to improve your diet or get more physical activity, they can help you with this, too.   Lab tests, if needed - The tests you get will depend on your age and situation. For example, your doctor might want to check your:   Cholesterol   Blood sugar   Iron level   Vaccines - The recommended vaccines will depend on your age, health, and what vaccines you already had. Vaccines are very important because they can prevent certain serious or deadly infections.   Discussion of screening - \"Screening\" means checking for diseases or other health problems before they cause symptoms. Your doctor can recommend screening based on your age, risk, and preferences. This might include tests to check for:   Cancer, such as breast, prostate, cervical, ovarian, colorectal, prostate, lung, or skin cancer   Sexually transmitted infections, such as chlamydia and gonorrhea   Mental health conditions like depression and anxiety  Your doctor will talk to you about the different types of screening tests. They can help you decide which screenings to have. They can also explain what the results might mean.   Answering questions - The physical is a good time to ask the doctor or nurse questions about your health. If needed, they can refer you to other doctors or specialists, too.  Adults older than 65 years often need other care, too. As you get older, your doctor will talk to you about:   How to prevent falling at " home   Hearing or vision tests   Memory testing   How to take your medicines safely   Making sure that you have the help and support you need at home  All topics are updated as new evidence becomes available and our peer review process is complete.  This topic retrieved from [employer] on: May 02, 2024.  Topic 796479 Version 1.0  Release: 32.4.3 - C32.122  © 2024 UpToDate, Inc. and/or its affiliates. All rights reserved.  Consumer Information Use and Disclaimer   Disclaimer: This generalized information is a limited summary of diagnosis, treatment, and/or medication information. It is not meant to be comprehensive and should be used as a tool to help the user understand and/or assess potential diagnostic and treatment options. It does NOT include all information about conditions, treatments, medications, side effects, or risks that may apply to a specific patient. It is not intended to be medical advice or a substitute for the medical advice, diagnosis, or treatment of a health care provider based on the health care provider's examination and assessment of a patient's specific and unique circumstances. Patients must speak with a health care provider for complete information about their health, medical questions, and treatment options, including any risks or benefits regarding use of medications. This information does not endorse any treatments or medications as safe, effective, or approved for treating a specific patient. UpToDate, Inc. and its affiliates disclaim any warranty or liability relating to this information or the use thereof.The use of this information is governed by the Terms of Use, available at https://www.woltersMoneyspyderuwer.com/en/know/clinical-effectiveness-terms. 2024© UpToDate, Inc. and its affiliates and/or licensors. All rights reserved.  Copyright   © 2024 UpToDate, Inc. and/or its affiliates. All rights reserved.

## 2025-04-10 NOTE — PROGRESS NOTES
Adult Annual Physical  Name: Sylvain Choi      : 1980      MRN: [id number]  Encounter Provider: Lea Reyes, MD  Encounter Date: 4/10/2025   [facility]    :  Assessment & Plan  Annual physical exam         Screening, lipid    Orders:  •  Lipid panel; Future    Laboratory exam ordered as part of routine general medical examination    Orders:  •  CBC and differential; Future  •  Comprehensive metabolic panel; Future    Viral upper respiratory tract infection  Symptoms improving   Continue symptom directed treatment       Chronic rhinitis  Check out OTC versions   Orders:  •  azelastine (ASTELIN) 0.1 % nasal spray; 2 sprays into each nostril daily Use in each nostril as directed  •  mometasone (NASONEX) 50 mcg/act nasal spray; 2 sprays into each nostril daily    Allergic rhinitis due to house dust mite    Orders:  •  azelastine (ASTELIN) 0.1 % nasal spray; 2 sprays into each nostril daily Use in each nostril as directed  •  mometasone (NASONEX) 50 mcg/act nasal spray; 2 sprays into each nostril daily    Chronic seasonal allergic rhinitis due to pollen    Orders:  •  azelastine (ASTELIN) 0.1 % nasal spray; 2 sprays into each nostril daily Use in each nostril as directed  •  mometasone (NASONEX) 50 mcg/act nasal spray; 2 sprays into each nostril daily    Allergic rhinitis due to cats    Orders:  •  azelastine (ASTELIN) 0.1 % nasal spray; 2 sprays into each nostril daily Use in each nostril as directed  •  mometasone (NASONEX) 50 mcg/act nasal spray; 2 sprays into each nostril daily    Annual physical exam               Preventive Screenings:  - Diabetes Screening: screening up-to-date  - Cholesterol Screening: orders placed   - HIV screening: screening up-to-date   - Colon cancer screening: screening up-to-date   - Lung cancer screening: screening not indicated   - Prostate cancer screening: screening not indicated     Counseling/Anticipatory Guidance:    - Diet:  "discussed recommendations for a healthy/well-balanced diet.   - Exercise: the importance of regular exercise/physical activity was discussed. Recommend exercise 3-5 times per week for at least 30 minutes.          History of Present Illness     Adult Annual Physical:  Patient presents for annual physical.     Diet and Physical Activity:  - Diet/Nutrition: no special diet.  - Exercise: no formal exercise.    Depression Screening:  - PHQ-2 Score: 0    General Health:  - Sleep: sleeps poorly.  - Hearing: normal hearing bilateral ears.  - Vision: wears glasses and most recent eye exam < 1 year ago.  - Dental: regular dental visits.    /GYN Health:  - Follows with GYN: no.   - History of STDs: no     Health:  - History of STDs: no.   - Urinary symptoms: none.     Advanced Care Planning:  - Has an advanced directive?: yes    - Has a durable medical POA?: yes      Review of Systems   Constitutional:  Negative for chills and fever.   HENT:  Positive for congestion and sore throat.    Respiratory:  Positive for cough. Negative for shortness of breath.    Cardiovascular:  Negative for chest pain and palpitations.   Gastrointestinal:  Negative for abdominal pain, constipation and diarrhea.   Genitourinary:  Negative for difficulty urinating.   Musculoskeletal:  Positive for arthralgias.   Neurological:  Negative for dizziness and headaches.         Objective   /60 (BP Location: Left arm, Patient Position: Sitting, Cuff Size: Large)   Pulse 75   Temp (!) 97.4 °F (36.3 °C) (Tympanic)   Ht 5' 10\" (1.778 m)   Wt 110 kg (241 lb 12.8 oz)   SpO2 98%   BMI 34.69 kg/m²     Physical Exam  Constitutional:       Appearance: He is well-developed. He is obese. He is not ill-appearing.   Eyes:      Conjunctiva/sclera: Conjunctivae normal.   Cardiovascular:      Rate and Rhythm: Normal rate and regular rhythm.      Heart sounds: Normal heart sounds. No murmur heard.  Pulmonary:      Effort: Pulmonary effort is normal. No " respiratory distress.      Breath sounds: Normal breath sounds. No wheezing or rales.   Abdominal:      General: There is no distension.      Palpations: Abdomen is soft. There is no mass.      Tenderness: There is no abdominal tenderness. There is no guarding or rebound.   Musculoskeletal:      Cervical back: Neck supple.      Right lower leg: No edema.      Left lower leg: No edema.   Neurological:      Mental Status: He is alert and oriented to person, place, and time.   Psychiatric:         Mood and Affect: Mood normal.         Behavior: Behavior normal.         Thought Content: Thought content normal.         Judgment: Judgment normal.

## 2025-04-11 ENCOUNTER — TELEPHONE (OUTPATIENT)
Dept: OBGYN CLINIC | Facility: HOSPITAL | Age: 45
End: 2025-04-11

## 2025-04-14 ENCOUNTER — OFFICE VISIT (OUTPATIENT)
Dept: OBGYN CLINIC | Facility: CLINIC | Age: 45
End: 2025-04-14
Payer: COMMERCIAL

## 2025-04-14 VITALS — WEIGHT: 241 LBS | HEIGHT: 70 IN | BODY MASS INDEX: 34.5 KG/M2

## 2025-04-14 DIAGNOSIS — Z98.890 STATUS POST LEFT ROTATOR CUFF REPAIR: Primary | ICD-10-CM

## 2025-04-14 PROCEDURE — 99213 OFFICE O/P EST LOW 20 MIN: CPT | Performed by: ORTHOPAEDIC SURGERY

## 2025-04-14 NOTE — PROGRESS NOTES
Sports Medicine and Shoulder Surgery    Sylvain Choi, 44 y.o. male   MRN# [id number]   : 1980          Subjective   CHIEF COMPLAINT/REASON FOR VISIT  Sylvain Choi is a 44 y.o. male who presents for  3.5 month post op follow-up after Arthroscopic repair of rotator cuff, subacromial decompression, - Left on 2024     HISTORY OF PRESENT ILLNESS  Overall, he feels things are going well and progressing appropriately. Patient said his motion has been progressing well. He denies pain. He said PT has been going well.    Objective   PHYSICAL EXAMINATION  General:   Well-appearing  No acute distress  Appears stated age    left Shoulder  Skin is intact with no erythema, warmth or drainage. Surgical incisions well healed.  Shoulder effusion none present  Shoulder abduction 160 / 180  Shoulder forward flexion 160 / 180  Shoulder internal rotation T12 / T7  Supraspinatus/ABD 4/5   Infraspinatus/ER 5/5   Subscapularis/IR 5/5   Negative Belly Press/SS  Negative Neer  Negative Mcguire  Negative O'briens  Motor strength intact distally  Sensation to light touch is normal in the axillary, radial, ulnar, and median nerve distributions.  Fingers warm and perfused     Assessment & Plan  Status post left rotator cuff repair    Orders:    Ambulatory Referral to Physical Therapy; Future       1. Status Post Arthroscopic repair of rotator cuff, subacromial decompression, - Left    Patient is making faster than expected progress from the date of surgery  Encouraged to continue with work with PT/at home on rehabilitation  We will plan to see him back at the  6-9 month post-operative jimmy  If any issues, questions, or concerns arise between now and the next appointment, we have encouraged the patient contact our team.        Scribe Attestation      I,:  Davin Arias PA-C am acting as a scribe while in the presence of the attending physician.:       I,:  Sylvain Lozano MD personally performed the services described  in this documentation    as scribed in my presence.:

## 2025-04-15 ENCOUNTER — OFFICE VISIT (OUTPATIENT)
Dept: PHYSICAL THERAPY | Facility: CLINIC | Age: 45
End: 2025-04-15
Payer: COMMERCIAL

## 2025-04-15 DIAGNOSIS — S46.012A TRAUMATIC COMPLETE TEAR OF LEFT ROTATOR CUFF, INITIAL ENCOUNTER: ICD-10-CM

## 2025-04-15 DIAGNOSIS — M25.512 ACUTE PAIN OF LEFT SHOULDER: ICD-10-CM

## 2025-04-15 DIAGNOSIS — Z98.890 STATUS POST LEFT ROTATOR CUFF REPAIR: Primary | ICD-10-CM

## 2025-04-15 PROCEDURE — 97110 THERAPEUTIC EXERCISES: CPT

## 2025-04-15 PROCEDURE — 97112 NEUROMUSCULAR REEDUCATION: CPT

## 2025-04-15 NOTE — PROGRESS NOTES
Daily Note     Today's date: 4/15/2025  Patient name: Sylvain Choi  : 1980  MRN: [id number]  Referring provider: Davin Arias PA*  Dx:   Encounter Diagnosis     ICD-10-CM    1. Status post left rotator cuff repair  Z98.890       2. Traumatic complete tear of left rotator cuff, initial encounter  S46.012A       3. Acute pain of left shoulder  M25.512                      Subjective: Pt states his follow up appt went well yesterday. He still feels stiff when reaching OH but pain has been improving.       Objective: See treatment diary below      Assessment: Tolerated treatment well. Patient  fatigued quickly when working on increased reps into OH ranges. Cueing req for form intermittently.      Plan: Continue per plan of care.      Precautions: RC repair protocol in op notes (attached to chart)  Dx: LEFT RC repair and subacromial decompression 24    Manuals  4/   Shoulder PROM 10 mins 10 mins       10 mins 10 mins   GHJ mobs                                       Neuro Re-Ed             Pendulums             Pulleys 2x15 flex and abd 2x15 flex and abd       2x15 flex and abd 2x15 flex and abd   Cane AAROM flexion (supine) and ER (supine) 1x15 supine flex 2x10 supine flex       1x15 supine flex 1x15 supine flex   Wall slides 1.5#  3x10 2# 3x10       1.5# 3x8 1.5#  3x10   Scaption to 90d 2#  3x8 2# 3x10       1# 3x8 1#  3x10   Sh abd  2#  3x8 2# 3x10       1# 3x8 1#  3x10   Sh FF full range 3x10 3x10       3x8 3x8                Ther Ex             Neutral sh ER RTB  3x12 GTB 3x10        RTB  3x10 RTB  3x12   Neutral sh IR             TB row             TB sh ext             S/L sh ER 2#  3x10 2# 3x10        1# 3x8 2#  3x8                Ther Activity                                       Gait Training                                       Modalities

## 2025-04-18 ENCOUNTER — APPOINTMENT (OUTPATIENT)
Dept: PHYSICAL THERAPY | Facility: CLINIC | Age: 45
End: 2025-04-18
Payer: COMMERCIAL

## 2025-04-22 ENCOUNTER — EVALUATION (OUTPATIENT)
Dept: PHYSICAL THERAPY | Facility: CLINIC | Age: 45
End: 2025-04-22
Payer: COMMERCIAL

## 2025-04-22 DIAGNOSIS — M25.512 ACUTE PAIN OF LEFT SHOULDER: ICD-10-CM

## 2025-04-22 DIAGNOSIS — S46.012A TRAUMATIC COMPLETE TEAR OF LEFT ROTATOR CUFF, INITIAL ENCOUNTER: ICD-10-CM

## 2025-04-22 DIAGNOSIS — Z98.890 STATUS POST LEFT ROTATOR CUFF REPAIR: Primary | ICD-10-CM

## 2025-04-22 PROCEDURE — 97110 THERAPEUTIC EXERCISES: CPT

## 2025-04-22 PROCEDURE — 97112 NEUROMUSCULAR REEDUCATION: CPT

## 2025-04-22 PROCEDURE — 97164 PT RE-EVAL EST PLAN CARE: CPT

## 2025-04-22 NOTE — PROGRESS NOTES
PT Re-Evaluation     Today's date: 2025  Patient name: Sylvain Choi  : 1980  MRN: [id number]  Referring provider: Davin Arias PA*  Dx:   Encounter Diagnosis     ICD-10-CM    1. Status post left rotator cuff repair  Z98.890       2. Traumatic complete tear of left rotator cuff, initial encounter  S46.012A       3. Acute pain of left shoulder  M25.512                        Assessment  Impairments: abnormal coordination, abnormal or restricted ROM, activity intolerance, impaired physical strength, lacks appropriate home exercise program, pain with function, unable to perform ADL, participation limitations, activity limitations and endurance  Symptom irritability: low    Assessment details: Pt is 16 weeks post op RC repair on 24 performed by Dr. Lozano. Pt has been compliant with PT attendance and has moderate compliance with home exercise program and stretches. Impairments at this time include decreased FF, abd, ER, and IR ROM, decreased global shoulder strength, decreased UE stability in both OKC and CKC positions, decreased scapular NM control and posterior chain strength. These deficits cont to limit patients ability to do ADL's of dressing, washing, reaching to the side, OH, or behind his back, lifting his kids, lifting objects OH into cabinets, or doing any recreational activities. Continued skilled PT is required at this time to address deficits and progress through post op protocol safely.       Understanding of Dx/Px/POC: good     Prognosis: good    Goals  STG's:  1. In 4 weeks, pt will have 90d of flexion, 35d of ER, and 60d abd (100% met)  2 In 4 weeks, pt will be sleeping 3-4 hrs w/out interruption by pain. (100% met)  3. In 4 weeks, pt will tolerate walking 30 mins in sling w/out sh pain. (100% met)  4. In 6 weeks, pt will tolerate being out of sling for duration of the day. (100% met)    LTG's:  1. In 12 weeks, pt will restore full AROM LUCIANO. (65% met)  2. In 12 weeks, pt  will demo 4-/5 MMT. (85% met)  3. In 16 weeks, pt will demo 4/5 MMT.(50% met)  4. In 16 weeks, pt will tolerate lifting 15# at their side. (25% met)  5. In 20 weeks, pt will tolerate lifting 25# at side and at least 5# OH. (10% met)    Plan  Patient would benefit from: skilled physical therapy  Planned modality interventions: low level laser therapy    Planned therapy interventions: joint mobilization, manual therapy, neuromuscular re-education, patient/caregiver education, self care, strengthening, therapeutic activities, therapeutic exercise, stretching, therapeutic training, home exercise program, graded exercise, graded activity, functional ROM exercises, flexibility, coordination, activity modification and ADL retraining    Frequency: 2x week  Duration in weeks: 12  Plan of Care beginning date: 4/22/2025  Plan of Care expiration date: 7/15/2025  Treatment plan discussed with: patient        Subjective Evaluation    History of Present Illness  Date of onset: 10/31/2024  Date of surgery: 12/31/2024  Mechanism of injury: surgery  Mechanism of injury: Pt is 16 weeks s/p RC repair on 12/31/24 performed by Dr. Lozano. He continues to have achy pain present when reaching end ranges OH and if he moves the arm too quickly to the side, OH, or behind his back. Pain at worst is reaching 4/10 but will be 0/10 if resting and not using it. He is not having pain while sleeping any longer. He is very quick to fatigue and can't maintain a position with his arm for longer than a few seconds because of muscle burn. Biggest challenge on a daily basis is still lifting anything OH, to the front, or to the side which is difficult with caring for young kids and household chores.      Quality of life: good    Patient Goals  Patient goals for therapy: decreased edema, decreased pain, increased motion, return to work, return to sport/leisure activities, independence with ADLs/IADLs and increased strength  Patient goal: Play with his  children without pain  Pain  Current pain ratin  At best pain ratin  At worst pain rating: 3  Quality: dull ache and throbbing  Relieving factors: medications, rest, support and ice  Aggravating factors: eating, sitting, standing, walking, lifting, keyboarding and overhead activity  Progression: improving      Objective     Postural Observations    Additional Postural Observation Details  Forward head and rounded shoulder position in sitting.     Observations     Right Shoulder  Positive for incision.       Palpation   Left   Tenderness of the anterior deltoid, biceps, infraspinatus, supraspinatus    Tenderness     Left Shoulder   Tenderness in the infraspinatus tendon, subacromial bursa and supraspinatus tendon.     Cervical/Thoracic Screen   Cervical range of motion within normal limits    Active Range of Motion   Left shoulder  Flexion: 157 degrees w/ pulling  External rotation 0d: 63 degrees w/ pulling  External rotation 90d: 70 degrees w/ discomfort  Abduction: 152 degrees w/ pulling     Right Shoulder   Normal active range of motion      Passive Range of Motion   Left Shoulder   Flexion: 169 degrees with pain  External rotation 0°: 75 degrees with pain  External rotation 90d: 78 degrees w/ pain  Abduction: 160d w/ pain    Right Shoulder   Normal passive range of motion    Strength/Myotome Testing   Left Shoulder  Flexion: 4-/5  Extension: 4/5  Abduction: 4-/5  ER 0d: 4-/5  IR 0d: 4/5    Right Shoulder   Normal muscle strength      Tests     Additional Tests Details  Special testing held d/t post op status.

## 2025-04-22 NOTE — PROGRESS NOTES
Daily Note     Today's date: 2025  Patient name: Sylvain Choi  : 1980  MRN: [id number]  Referring provider: Davin Arias PA*  Dx:   Encounter Diagnosis     ICD-10-CM    1. Status post left rotator cuff repair  Z98.890       2. Traumatic complete tear of left rotator cuff, initial encounter  S46.012A       3. Acute pain of left shoulder  M25.512                      Subjective: Pt states his shoulder is less achy but still gets tired quickly.       Objective: See treatment diary below      Assessment: Tolerated treatment well. Patient demonstrated fatigue post treatment w/ increased resistance and reps into OKC flexion and abd positions. Cueing req for scapular control intermittently.       Plan: Continue per plan of care.      Precautions: RC repair protocol in op notes (attached to chart)  Dx: LEFT RC repair and subacromial decompression 24    Manuals  4   Shoulder PROM 10 mins 10 mins 10 mins      10 mins 10 mins   GHJ mobs                                       Neuro Re-Ed             Pendulums             Pulleys 2x15 flex and abd 2x15 flex and abd 2x15 flex and abd      2x15 flex and abd 2x15 flex and abd   Cane AAROM flexion (supine) and ER (supine) 1x15 supine flex 2x10 supine flex 2x10 supine flex      1x15 supine flex 1x15 supine flex   Wall slides 1.5#  3x10 2# 3x10 2# 3x12      1.5# 3x8 1.5#  3x10   Scaption to 90d 2#  3x8 2# 3x10 2# 3x10      1# 3x8 1#  3x10   Sh abd  2#  3x8 2# 3x10 2# 3x10      1# 3x8 1#  3x10   Sh FF full range 3x10 3x10 3x10      3x8 3x8                Ther Ex             Neutral sh ER RTB  3x12 GTB 3x10  GTB 3x10      RTB  3x10 RTB  3x12   Neutral sh IR             TB row   Black 2x10          TB sh ext   GTB 2x10          S/L sh ER 2#  3x10 2#   3x10  3# 3x10      1# 3x8 2#  3x8                Ther Activity                                       Gait Training                                       Modalities

## 2025-04-25 ENCOUNTER — OFFICE VISIT (OUTPATIENT)
Dept: PHYSICAL THERAPY | Facility: CLINIC | Age: 45
End: 2025-04-25
Payer: COMMERCIAL

## 2025-04-25 DIAGNOSIS — Z98.890 STATUS POST LEFT ROTATOR CUFF REPAIR: Primary | ICD-10-CM

## 2025-04-25 DIAGNOSIS — M25.512 ACUTE PAIN OF LEFT SHOULDER: ICD-10-CM

## 2025-04-25 DIAGNOSIS — S46.012A TRAUMATIC COMPLETE TEAR OF LEFT ROTATOR CUFF, INITIAL ENCOUNTER: ICD-10-CM

## 2025-04-25 PROCEDURE — 97110 THERAPEUTIC EXERCISES: CPT

## 2025-04-25 PROCEDURE — 97112 NEUROMUSCULAR REEDUCATION: CPT

## 2025-04-25 NOTE — PROGRESS NOTES
Daily Note     Today's date: 2025  Patient name: Sylvain Choi  : 1980  MRN: [id number]  Referring provider: Davin Arias PA*  Dx:   Encounter Diagnosis     ICD-10-CM    1. Status post left rotator cuff repair  Z98.890       2. Traumatic complete tear of left rotator cuff, initial encounter  S46.012A       3. Acute pain of left shoulder  M25.512           Start Time: 0800  Stop Time: 0845  Total time in clinic (min): 45 minutes    Subjective: Patient reports minor soreness prior to therapy. Notes his L shoulder quickly fatigues with exercise and functional activities.     Objective: See treatment diary below    Assessment: Patient demonstrates mod UT compensation with AROM shoulder abduction that required cueing to decrease. Limited AROM shoulder ABD noted less than 80° to correct form. He is making steady progress with PROM and functional movements, especially IR. Progress as able.     Plan: Continue per plan of care.      Precautions: RC repair protocol in op notes (attached to chart)  Dx: LEFT RC repair and subacromial decompression 24    Manuals          Shoulder PROM 10 mins 10 mins 10 mins 10 mins         GHJ mobs                                       Neuro Re-Ed             Pendulums             Pulleys 2x15 flex and abd 2x15 flex and abd 2x15 flex and abd 2x15 flex and abd         Cane AAROM flexion (supine) and ER (supine) 1x15 supine flex 2x10 supine flex 2x10 supine flex 2x10 supine flex         Wall slides 1.5#  3x10 2# 3x10 2# 3x12 2#  3x12         Scaption to 90d 2#  3x8 2# 3x10 2# 3x10 2# 3x10         Sh abd to 90d 2#  3x8 2# 3x10 2# 3x10 2# 3x10         Sh FF full range 3x10 3x10 3x10 3x10                      Ther Ex             Neutral sh ER RTB  3x12 GTB 3x10  GTB 3x10 GTB  3x10         Neutral sh IR             TB row   Black 2x10 Black  3x10         TB sh ext   GTB 2x10 GTB  3x10         S/L sh ER 2#  3x10 2#   3x10  3# 3x10 3#  3x10                       Ther Activity                                       Gait Training                                       Modalities

## 2025-04-29 ENCOUNTER — OFFICE VISIT (OUTPATIENT)
Dept: PHYSICAL THERAPY | Facility: CLINIC | Age: 45
End: 2025-04-29
Payer: COMMERCIAL

## 2025-04-29 DIAGNOSIS — Z98.890 STATUS POST LEFT ROTATOR CUFF REPAIR: Primary | ICD-10-CM

## 2025-04-29 DIAGNOSIS — S46.012A TRAUMATIC COMPLETE TEAR OF LEFT ROTATOR CUFF, INITIAL ENCOUNTER: ICD-10-CM

## 2025-04-29 DIAGNOSIS — M25.512 ACUTE PAIN OF LEFT SHOULDER: ICD-10-CM

## 2025-04-29 PROCEDURE — 97112 NEUROMUSCULAR REEDUCATION: CPT

## 2025-04-29 NOTE — PROGRESS NOTES
"Daily Note     Today's date: 2025  Patient name: Sylvain Choi  : 1980  MRN: [id number]  Referring provider: Davin Arias PA*  Dx:   Encounter Diagnosis     ICD-10-CM    1. Status post left rotator cuff repair  Z98.890       2. Traumatic complete tear of left rotator cuff, initial encounter  S46.012A       3. Acute pain of left shoulder  M25.512           Start Time: 0800  Stop Time: 0845  Total time in clinic (min): 45 minutes    Subjective: Patient reports, \"I don't get pain anymore. My shoulder just gets tired pretty quick\". \"I wonder how much more therapy I will need\".     Objective: See treatment diary below    Assessment: Patient continues to demonstrate improved PROM each visit. Making steady progress with strengthening. Most challenged with OH and arthrokinematic of AROM shoulder abduction to 90°. Progress as able.     Plan: Continue per plan of care.      Precautions: RC repair protocol in op notes (attached to chart)  Dx: LEFT RC repair and subacromial decompression 24    Manuals         Shoulder PROM 10 mins 10 mins 10 mins 10 mins 10 mins        GHJ mobs                                       Neuro Re-Ed             Pendulums             Pulleys 2x15 flex and abd 2x15 flex and abd 2x15 flex and abd 2x15 flex and abd 2x15 flex and abd        Cane AAROM flexion (supine) and ER (supine) 1x15 supine flex 2x10 supine flex 2x10 supine flex 2x10 supine flex 2x10 supine flex        Wall slides 1.5#  3x10 2# 3x10 2# 3x12 2#  3x12 2#  3x12        Scaption to 90d 2#  3x8 2# 3x10 2# 3x10 2# 3x10 3#  3x10        Sh abd to 90d 2#  3x8 2# 3x10 2# 3x10 2# 3x10 3#  3x10        Sh FF full range 3x10 3x10 3x10 3x10   3x10                     Ther Ex             Neutral sh ER RTB  3x12 GTB 3x10  GTB 3x10 GTB  3x10 GTB  3x10        Neutral sh IR             TB row   Black 2x10 Black  3x10 Black  3x10        TB sh ext   GTB 2x10 GTB  3x10 GTB  3x10        S/L sh ER 2#  3x10 " 2#   3x10  3# 3x10 3#  3x10 3#  3x10                     Ther Activity                                       Gait Training                                       Modalities

## 2025-05-02 ENCOUNTER — OFFICE VISIT (OUTPATIENT)
Dept: PHYSICAL THERAPY | Facility: CLINIC | Age: 45
End: 2025-05-02
Payer: COMMERCIAL

## 2025-05-02 DIAGNOSIS — Z98.890 STATUS POST LEFT ROTATOR CUFF REPAIR: Primary | ICD-10-CM

## 2025-05-02 DIAGNOSIS — S46.012A TRAUMATIC COMPLETE TEAR OF LEFT ROTATOR CUFF, INITIAL ENCOUNTER: ICD-10-CM

## 2025-05-02 DIAGNOSIS — M25.512 ACUTE PAIN OF LEFT SHOULDER: ICD-10-CM

## 2025-05-02 PROCEDURE — 97140 MANUAL THERAPY 1/> REGIONS: CPT

## 2025-05-02 PROCEDURE — 97112 NEUROMUSCULAR REEDUCATION: CPT

## 2025-05-02 NOTE — PROGRESS NOTES
Daily Note     Today's date: 2025  Patient name: Sylvain Choi  : 1980  MRN: [id number]  Referring provider: Davin Arias PA*  Dx:   Encounter Diagnosis     ICD-10-CM    1. Status post left rotator cuff repair  Z98.890       2. Traumatic complete tear of left rotator cuff, initial encounter  S46.012A       3. Acute pain of left shoulder  M25.512                      Subjective: Pt states that his shoulder is doing better but does have lingering pain at end range OH positions.       Objective: See treatment diary below      Assessment: Tolerated treatment well. Patient demonstrated fatigue post treatment. Cont to tolerate inc volume of OH activity during sessions.      Plan: Continue per plan of care.      Precautions: RC repair protocol in op notes (attached to chart)  Dx: LEFT RC repair and subacromial decompression 24    Manuals        Shoulder PROM 10 mins 10 mins 10 mins 10 mins 10 mins 8 mins       GHJ mobs                                       Neuro Re-Ed             Pendulums             Pulleys 2x15 flex and abd 2x15 flex and abd 2x15 flex and abd 2x15 flex and abd 2x15 flex and abd 2x15 flex and abd       Cane AAROM flexion (supine) and ER (supine) 1x15 supine flex 2x10 supine flex 2x10 supine flex 2x10 supine flex 2x10 supine flex 2x10 flex       Wall slides 1.5#  3x10 2# 3x10 2# 3x12 2#  3x12 2#  3x12 2.5# 3x10       Scaption to 90d 2#  3x8 2# 3x10 2# 3x10 2# 3x10 3#  3x10 3# 3x12       Sh abd to 90d 2#  3x8 2# 3x10 2# 3x10 2# 3x10 3#  3x10 3# 3x10       Sh FF full range 3x10 3x10 3x10 3x10   3x10 3x10                    Ther Ex             Neutral sh ER RTB  3x12 GTB 3x10  GTB 3x10 GTB  3x10 GTB  3x10 GTB 3x10        Neutral sh IR             TB row   Black 2x10 Black  3x10 Black  3x10 Black 3x12       TB sh ext   GTB 2x10 GTB  3x10 GTB  3x10 Black 3x10       S/L sh ER 2#  3x10 2#   3x10  3# 3x10 3#  3x10 3#  3x10 3# 3x10                    Ther  Activity                                       Gait Training                                       Modalities

## 2025-05-06 ENCOUNTER — OFFICE VISIT (OUTPATIENT)
Dept: PHYSICAL THERAPY | Facility: CLINIC | Age: 45
End: 2025-05-06
Payer: COMMERCIAL

## 2025-05-06 DIAGNOSIS — S46.012A TRAUMATIC COMPLETE TEAR OF LEFT ROTATOR CUFF, INITIAL ENCOUNTER: ICD-10-CM

## 2025-05-06 DIAGNOSIS — Z98.890 STATUS POST LEFT ROTATOR CUFF REPAIR: Primary | ICD-10-CM

## 2025-05-06 DIAGNOSIS — M25.512 ACUTE PAIN OF LEFT SHOULDER: ICD-10-CM

## 2025-05-06 PROCEDURE — 97110 THERAPEUTIC EXERCISES: CPT

## 2025-05-06 PROCEDURE — 97112 NEUROMUSCULAR REEDUCATION: CPT

## 2025-05-06 NOTE — PROGRESS NOTES
Daily Note     Today's date: 2025  Patient name: Sylvain Choi  : 1980  MRN: [id number]  Referring provider: Davin Arias PA*  Dx:   Encounter Diagnosis     ICD-10-CM    1. Status post left rotator cuff repair  Z98.890       2. Traumatic complete tear of left rotator cuff, initial encounter  S46.012A       3. Acute pain of left shoulder  M25.512                      Subjective: Pt states his shoulder was a little more sore this weekend with laying on it.      Objective: See treatment diary below      Assessment: Tolerated treatment well. Patient demonstrated fatigue post treatment with integration of prone TE variations for posterior chain strength. Cueing req for form to decrease ant GHJ glide and emphasize scapular mm control.       Plan: Continue per plan of care.      Precautions: RC repair protocol in op notes (attached to chart)  Dx: LEFT RC repair and subacromial decompression 24    Manuals       Shoulder PROM 10 mins 10 mins 10 mins 10 mins 10 mins 8 mins       GHJ mobs                                       Neuro Re-Ed             Pendulums             Pulleys 2x15 flex and abd 2x15 flex and abd 2x15 flex and abd 2x15 flex and abd 2x15 flex and abd 2x15 flex and abd 1x10 flex and abd      Cane AAROM flexion (supine) and ER (supine) 1x15 supine flex 2x10 supine flex 2x10 supine flex 2x10 supine flex 2x10 supine flex 2x10 flex np      Wall slides 1.5#  3x10 2# 3x10 2# 3x12 2#  3x12 2#  3x12 2.5# 3x10 2x10      Scaption to 90d 2#  3x8 2# 3x10 2# 3x10 2# 3x10 3#  3x10 3# 3x12 4# 3x10      Sh abd to 90d 2#  3x8 2# 3x10 2# 3x10 2# 3x10 3#  3x10 3# 3x10 np      Sh FF full range 3x10 3x10 3x10 3x10   3x10 3x10 np                   Ther Ex             Neutral sh ER RTB  3x12 GTB 3x10  GTB 3x10 GTB  3x10 GTB  3x10 GTB 3x10  BTB 3x8      TB row   Black 2x10 Black  3x10 Black  3x10 Black 3x12 Black 3x12      TB sh ext   GTB 2x10 GTB  3x10 GTB  3x10 Black 3x10  Black 3x10      TB T       YTB 2x10      S/L sh ER 2#  3x10 2#   3x10  3# 3x10 3#  3x10 3#  3x10 3# 3x10 3# 3x12      Prone rows       3# 2x10    Wide row   1# 1x10      Ther Activity                                       Gait Training                                       Modalities

## 2025-05-09 ENCOUNTER — OFFICE VISIT (OUTPATIENT)
Dept: PHYSICAL THERAPY | Facility: CLINIC | Age: 45
End: 2025-05-09
Payer: COMMERCIAL

## 2025-05-09 DIAGNOSIS — Z98.890 STATUS POST LEFT ROTATOR CUFF REPAIR: Primary | ICD-10-CM

## 2025-05-09 DIAGNOSIS — S46.012A TRAUMATIC COMPLETE TEAR OF LEFT ROTATOR CUFF, INITIAL ENCOUNTER: ICD-10-CM

## 2025-05-09 DIAGNOSIS — M25.512 ACUTE PAIN OF LEFT SHOULDER: ICD-10-CM

## 2025-05-09 PROCEDURE — 97112 NEUROMUSCULAR REEDUCATION: CPT

## 2025-05-09 NOTE — PROGRESS NOTES
"Daily Note     Today's date: 2025  Patient name: Sylvain Choi  : 1980  MRN: [id number]  Referring provider: Davin Arias PA*  Dx:   Encounter Diagnosis     ICD-10-CM    1. Status post left rotator cuff repair  Z98.890       2. Traumatic complete tear of left rotator cuff, initial encounter  S46.012A       3. Acute pain of left shoulder  M25.512                      Subjective: Pt states that his shoulder is doing good, just feels stiff at the farthest point OH and fatigues in that same positions quickly.       Objective: See treatment diary below      Assessment: Tolerated treatment well. Patient  was sore with emphasis on end range flexion but did not experience pain during TE. Min cueing req for form w/ exercises.       Plan: Continue per plan of care.      Precautions: RC repair protocol in op notes (attached to chart)  Dx: LEFT RC repair and subacromial decompression 24    Manuals      Shoulder PROM 10 mins 10 mins 10 mins 10 mins 10 mins 8 mins       GHJ mobs                                       Neuro Re-Ed             Pendulums             Pulleys 2x15 flex and abd 2x15 flex and abd 2x15 flex and abd 2x15 flex and abd 2x15 flex and abd 2x15 flex and abd 1x10 flex and abd 1x15 flex and abd     Flexion door stretch        3x15\"    3x5 wall lift offs     Wall slides 1.5#  3x10 2# 3x10 2# 3x12 2#  3x12 2#  3x12 2.5# 3x10 2x10 Both hands  2x10     Scaption to 90d 2#  3x8 2# 3x10 2# 3x10 2# 3x10 3#  3x10 3# 3x12 4# 3x10 4# 3x10     Sh abd to 90d 2#  3x8 2# 3x10 2# 3x10 2# 3x10 3#  3x10 3# 3x10 np 4# 2x10     Sh FF full range 3x10 3x10 3x10 3x10   3x10 3x10 np np                  Ther Ex             Neutral sh ER RTB  3x12 GTB 3x10  GTB 3x10 GTB  3x10 GTB  3x10 GTB 3x10  BTB 3x8 BTB 3x10     TB row   Black 2x10 Black  3x10 Black  3x10 Black 3x12 Black 3x12 10# cable 3x10     TB sh ext   GTB 2x10 GTB  3x10 GTB  3x10 Black 3x10 Black 3x10      TB T       " YTB 2x10 YTB 2x10     S/L sh ER 2#  3x10 2#   3x10  3# 3x10 3#  3x10 3#  3x10 3# 3x10 3# 3x12 3# 3x15     Prone rows       3# 2x10    Wide row   1# 1x10 3# 2x10    Wide row 1# 2x10     Ther Activity                                       Gait Training                                       Modalities

## 2025-05-13 ENCOUNTER — OFFICE VISIT (OUTPATIENT)
Dept: PHYSICAL THERAPY | Facility: CLINIC | Age: 45
End: 2025-05-13
Payer: COMMERCIAL

## 2025-05-13 DIAGNOSIS — M25.512 ACUTE PAIN OF LEFT SHOULDER: ICD-10-CM

## 2025-05-13 DIAGNOSIS — Z98.890 STATUS POST LEFT ROTATOR CUFF REPAIR: Primary | ICD-10-CM

## 2025-05-13 DIAGNOSIS — S46.012A TRAUMATIC COMPLETE TEAR OF LEFT ROTATOR CUFF, INITIAL ENCOUNTER: ICD-10-CM

## 2025-05-13 PROCEDURE — 97112 NEUROMUSCULAR REEDUCATION: CPT

## 2025-05-13 PROCEDURE — 97110 THERAPEUTIC EXERCISES: CPT

## 2025-05-13 NOTE — PROGRESS NOTES
"Daily Note     Today's date: 2025  Patient name: Sylvain Choi  : 1980  MRN: [id number]  Referring provider: Davin Arias PA*  Dx:   Encounter Diagnosis     ICD-10-CM    1. Status post left rotator cuff repair  Z98.890       2. Traumatic complete tear of left rotator cuff, initial encounter  S46.012A       3. Acute pain of left shoulder  M25.512                      Subjective: Pt states his shoulder will hurt when he quickly moves the arm but otherwise is gradually improving.       Objective: See treatment diary below      Assessment: Tolerated treatment well. Patient demonstrated fatigue post treatment with increase in resistance where tolerated to cont progressing global shoulder strength and working on UE stability.       Plan: Continue per plan of care.      Precautions: RC repair protocol in op notes (attached to chart)  Dx: LEFT RC repair and subacromial decompression 24    Manuals     Shoulder PROM 10 mins 10 mins 10 mins 10 mins 10 mins 8 mins       GHJ mobs                                       Neuro Re-Ed             Pendulums             Pulleys 2x15 flex and abd 2x15 flex and abd 2x15 flex and abd 2x15 flex and abd 2x15 flex and abd 2x15 flex and abd 1x10 flex and abd 1x15 flex and abd 1x15 flex and abd    Flexion door stretch        3x15\"    3x5 wall lift offs 3x15\"    3x5 wall lift offs    Wall slides 1.5#  3x10 2# 3x10 2# 3x12 2#  3x12 2#  3x12 2.5# 3x10 2x10 Both hands  2x10 Both hands    Scaption to 90d 2#  3x8 2# 3x10 2# 3x10 2# 3x10 3#  3x10 3# 3x12 4# 3x10 4# 3x10 5# 3x8    Sh abd to 90d 2#  3x8 2# 3x10 2# 3x10 2# 3x10 3#  3x10 3# 3x10 np 4# 2x10 4# 3x10    Sh FF full range 3x10 3x10 3x10 3x10   3x10 3x10 np np                  Ther Ex             Neutral sh ER RTB  3x12 GTB 3x10  GTB 3x10 GTB  3x10 GTB  3x10 GTB 3x10  BTB 3x8 BTB 3x10 5# cable 3x10    TB row   Black 2x10 Black  3x10 Black  3x10 Black 3x12 Black 3x12 10# cable " 3x10 15# cable 3x10    TB sh ext   GTB 2x10 GTB  3x10 GTB  3x10 Black 3x10 Black 3x10      TB T       YTB 2x10 YTB 2x10 YTB 3x10    S/L sh ER 2#  3x10 2#   3x10  3# 3x10 3#  3x10 3#  3x10 3# 3x10 3# 3x12 3# 3x15 3# 3x15    Prone rows       3# 2x10    Wide row   1# 1x10 3# 2x10    Wide row 1# 2x10 3# 3x10     Wide row 1# 2x10                 Ther Activity             OH press         3# 3x10                 Gait Training                                       Modalities

## 2025-05-16 ENCOUNTER — APPOINTMENT (OUTPATIENT)
Dept: PHYSICAL THERAPY | Facility: CLINIC | Age: 45
End: 2025-05-16
Payer: COMMERCIAL

## 2025-05-20 ENCOUNTER — OFFICE VISIT (OUTPATIENT)
Dept: PHYSICAL THERAPY | Facility: CLINIC | Age: 45
End: 2025-05-20
Payer: COMMERCIAL

## 2025-05-20 DIAGNOSIS — S46.012A TRAUMATIC COMPLETE TEAR OF LEFT ROTATOR CUFF, INITIAL ENCOUNTER: ICD-10-CM

## 2025-05-20 DIAGNOSIS — Z98.890 STATUS POST LEFT ROTATOR CUFF REPAIR: Primary | ICD-10-CM

## 2025-05-20 DIAGNOSIS — M25.512 ACUTE PAIN OF LEFT SHOULDER: ICD-10-CM

## 2025-05-20 PROCEDURE — 97110 THERAPEUTIC EXERCISES: CPT

## 2025-05-20 PROCEDURE — 97112 NEUROMUSCULAR REEDUCATION: CPT

## 2025-05-20 PROCEDURE — 97140 MANUAL THERAPY 1/> REGIONS: CPT

## 2025-05-20 NOTE — PROGRESS NOTES
"Daily Note     Today's date: 2025  Patient name: Sylvain Choi  : 1980  MRN: [id number]  Referring provider: Davin Arias PA*  Dx:   Encounter Diagnosis     ICD-10-CM    1. Status post left rotator cuff repair  Z98.890       2. Traumatic complete tear of left rotator cuff, initial encounter  S46.012A       3. Acute pain of left shoulder  M25.512                      Subjective: Pt states that his shoulder had increased discomfort on Friday for almost the whole day which is not something he has had recently. He notes he tried to hang from monkey bars at the park with his kids a few days prior and is thinking that or the weather caused soreness. He felt back to baseline/normal the next day.       Objective: See treatment diary below      Assessment: Tolerated treatment well. Patient had sig fatigue w/ working into OH press movement. Cueing req to control end range of OH press range and promote appropriate scapular upward rotation.       Plan: Continue per plan of care.      Precautions: RC repair protocol in op notes (attached to chart)  Dx: LEFT RC repair and subacromial decompression 24    Manuals  5   Shoulder PROM 10 mins 10 mins 10 mins 10 mins 10 mins 8 mins    8 mins   J mobs                                       Neuro Re-Ed             Pendulums             Pulleys 2x15 flex and abd 2x15 flex and abd 2x15 flex and abd 2x15 flex and abd 2x15 flex and abd 2x15 flex and abd 1x10 flex and abd 1x15 flex and abd 1x15 flex and abd 1x15 flex and abd   Flexion door stretch        3x15\"    3x5 wall lift offs 3x15\"    3x5 wall lift offs 3x15\"    3x5 wall lift offs   Wall slides 1.5#  3x10 2# 3x10 2# 3x12 2#  3x12 2#  3x12 2.5# 3x10 2x10 Both hands  2x10 Both hands 2x10 Both hands 2x10   Scaption to 90d 2#  3x8 2# 3x10 2# 3x10 2# 3x10 3#  3x10 3# 3x12 4# 3x10 4# 3x10 5# 3x8 5# 3x10   Sh abd to 90d 2#  3x8 2# 3x10 2# 3x10 2# 3x10 3#  3x10 3# 3x10 np " 4# 2x10 4# 3x10 4# 3x10   Sh FF full range 3x10 3x10 3x10 3x10   3x10 3x10 np np                  Ther Ex             Neutral sh ER RTB  3x12 GTB 3x10  GTB 3x10 GTB  3x10 GTB  3x10 GTB 3x10  BTB 3x8 BTB 3x10 5# cable 3x10 5# cable 3x10   TB row   Black 2x10 Black  3x10 Black  3x10 Black 3x12 Black 3x12 10# cable 3x10 15# cable 3x10 15# cable 3x10   TB sh ext   GTB 2x10 GTB  3x10 GTB  3x10 Black 3x10 Black 3x10      TB T       YTB 2x10 YTB 2x10 YTB 3x10 YTB 3x10   S/L sh ER 2#  3x10 2#   3x10  3# 3x10 3#  3x10 3#  3x10 3# 3x10 3# 3x12 3# 3x15 3# 3x15 3# 3x15   Prone rows       3# 2x10    Wide row   1# 1x10 3# 2x10    Wide row 1# 2x10 3# 3x10     Wide row 1# 2x10                 Ther Activity             OH press         3# 3x5 3# 3x5                Gait Training                                       Modalities

## 2025-05-23 ENCOUNTER — APPOINTMENT (OUTPATIENT)
Dept: PHYSICAL THERAPY | Facility: CLINIC | Age: 45
End: 2025-05-23
Payer: COMMERCIAL

## 2025-05-25 ENCOUNTER — TELEMEDICINE (OUTPATIENT)
Dept: OTHER | Facility: HOSPITAL | Age: 45
End: 2025-05-25
Payer: COMMERCIAL

## 2025-05-25 VITALS — TEMPERATURE: 98 F

## 2025-05-25 DIAGNOSIS — H10.32 ACUTE CONJUNCTIVITIS OF LEFT EYE, UNSPECIFIED ACUTE CONJUNCTIVITIS TYPE: ICD-10-CM

## 2025-05-25 DIAGNOSIS — J01.90 ACUTE SINUSITIS, RECURRENCE NOT SPECIFIED, UNSPECIFIED LOCATION: Primary | ICD-10-CM

## 2025-05-25 PROCEDURE — 99213 OFFICE O/P EST LOW 20 MIN: CPT | Performed by: PHYSICIAN ASSISTANT

## 2025-05-25 RX ORDER — OLOPATADINE HYDROCHLORIDE 1 MG/ML
1 SOLUTION OPHTHALMIC 2 TIMES DAILY
Qty: 5 ML | Refills: 0 | Status: SHIPPED | OUTPATIENT
Start: 2025-05-25

## 2025-05-25 NOTE — PROGRESS NOTES
Virtual Regular Visit  Name: Sylvain Choi      : 1980      MRN: [id number]  Encounter Provider: Jaron Fair PA-C  Encounter Date: 2025   Encounter department: [facility]   :  Assessment & Plan  Acute conjunctivitis of left eye, unspecified acute conjunctivitis type  No evidence of bacterial conjunctivitis at this time  Orders:  •  olopatadine (PATANOL) 0.1 % ophthalmic solution; Administer 1 drop to both eyes 2 (two) times a day    Acute sinusitis, recurrence not specified, unspecified location  Recommend continued supportive measures, may take augmentin by day 10 if no improvement  Orders:  •  amoxicillin-clavulanate (AUGMENTIN) 875-125 mg per tablet; Take 1 tablet by mouth every 12 (twelve) hours for 7 days        History of Present Illness     Pt reports 6 days of congestion after a cold. On one side of throat by mouth he started having pain swallowing, got better. Has deep cough with chest congestion, green mucus. Left eye discharge has begun the past couple days. Pt denies any HANSON.  No contact lens use      Review of Systems   Constitutional:  Negative for chills and fever.   HENT:  Positive for sinus pressure, sinus pain and sore throat. Negative for ear pain, hearing loss, nosebleeds, postnasal drip and trouble swallowing.    Eyes:  Positive for discharge and redness. Negative for visual disturbance.   Respiratory:  Positive for cough. Negative for shortness of breath.    Cardiovascular:  Negative for chest pain.   Gastrointestinal:  Negative for abdominal pain, diarrhea, nausea and vomiting.   Psychiatric/Behavioral:  Negative for behavioral problems.        Objective   Temp 98 °F (36.7 °C)     Physical Exam  Constitutional:       General: He is not in acute distress.     Appearance: He is well-developed.   HENT:      Head: Normocephalic and atraumatic.      Right Ear: External ear normal.      Left Ear: External ear normal.      Nose: Congestion present. No rhinorrhea.       Mouth/Throat:      Mouth: Mucous membranes are moist.      Pharynx: No posterior oropharyngeal erythema.     Eyes:      General: No scleral icterus.     Extraocular Movements: Extraocular movements intact.     Neck:      Trachea: No tracheal deviation.     Cardiovascular:      Rate and Rhythm: Normal rate.   Pulmonary:      Effort: Pulmonary effort is normal. No respiratory distress.      Breath sounds: No stridor.     Musculoskeletal:      Cervical back: Neck supple.     Skin:     Findings: No erythema.     Neurological:      Mental Status: He is alert.     Psychiatric:         Behavior: Behavior normal.         Administrative Statements   Encounter provider Jaron Fair PA-C    The Patient is located at Home and in the following state in which I hold an active license [place].    The patient was identified by name and date of birth. Sylvain GARCIA Steph was informed that this is a telemedicine visit and that the visit is being conducted through the Epic Embedded platform. He agrees to proceed..  My office door was closed. No one else was in the room.  He acknowledged consent and understanding of privacy and security of the video platform. The patient has agreed to participate and understands they can discontinue the visit at any time.    I have spent a total time of 15 minutes in caring for this patient on the day of the visit/encounter including Risks and benefits of tx options, Instructions for management, Patient and family education, Importance of tx compliance, Impressions, Documenting in the medical record, Reviewing/placing orders in the medical record (including tests, medications, and/or procedures), and Obtaining or reviewing history  , not including the time spent for establishing the audio/video connection.

## 2025-05-27 ENCOUNTER — OFFICE VISIT (OUTPATIENT)
Dept: INTERNAL MEDICINE CLINIC | Facility: CLINIC | Age: 45
End: 2025-05-27
Payer: COMMERCIAL

## 2025-05-27 ENCOUNTER — OFFICE VISIT (OUTPATIENT)
Dept: PHYSICAL THERAPY | Facility: CLINIC | Age: 45
End: 2025-05-27
Payer: COMMERCIAL

## 2025-05-27 VITALS
SYSTOLIC BLOOD PRESSURE: 110 MMHG | HEART RATE: 79 BPM | WEIGHT: 237 LBS | BODY MASS INDEX: 34.01 KG/M2 | DIASTOLIC BLOOD PRESSURE: 72 MMHG | OXYGEN SATURATION: 97 %

## 2025-05-27 DIAGNOSIS — M25.512 ACUTE PAIN OF LEFT SHOULDER: ICD-10-CM

## 2025-05-27 DIAGNOSIS — J30.9 ALLERGIC SINUSITIS: Primary | ICD-10-CM

## 2025-05-27 DIAGNOSIS — J34.3 NASAL TURBINATE HYPERTROPHY: ICD-10-CM

## 2025-05-27 DIAGNOSIS — Z98.890 STATUS POST LEFT ROTATOR CUFF REPAIR: Primary | ICD-10-CM

## 2025-05-27 DIAGNOSIS — S46.012A TRAUMATIC COMPLETE TEAR OF LEFT ROTATOR CUFF, INITIAL ENCOUNTER: ICD-10-CM

## 2025-05-27 DIAGNOSIS — H10.13 ALLERGIC CONJUNCTIVITIS OF BOTH EYES: ICD-10-CM

## 2025-05-27 PROCEDURE — 97112 NEUROMUSCULAR REEDUCATION: CPT

## 2025-05-27 PROCEDURE — 97110 THERAPEUTIC EXERCISES: CPT

## 2025-05-27 PROCEDURE — 99214 OFFICE O/P EST MOD 30 MIN: CPT

## 2025-05-27 RX ORDER — FEXOFENADINE HCL 180 MG/1
180 TABLET ORAL DAILY
Qty: 90 TABLET | Refills: 0 | Status: SHIPPED | OUTPATIENT
Start: 2025-05-27

## 2025-05-27 RX ORDER — FEXOFENADINE HCL 180 MG/1
180 TABLET ORAL DAILY
Qty: 90 TABLET | Refills: 0 | Status: CANCELLED | OUTPATIENT
Start: 2025-05-27

## 2025-05-27 RX ORDER — FLUTICASONE PROPIONATE 50 MCG
1 SPRAY, SUSPENSION (ML) NASAL DAILY
Qty: 20 ML | Refills: 2 | Status: CANCELLED | OUTPATIENT
Start: 2025-05-27

## 2025-05-27 NOTE — PROGRESS NOTES
Name: Sylvain Choi      : 1980      MRN: [id number]  Encounter Provider: José Miguel Goldberg MD  Encounter Date: 2025   [facility]  :  Assessment & Plan  Allergic sinusitis  Nasal turbinate hypertrophy  Ongoing for about 6 days now.  Was seen at a telemedicine visit, was prescribed Augmentin.  On examination, right inferior nasal turbinate causing more than 90% occlusion of nasal passage.  Also associated with cobblestoning of posterior pharyngeal wall.  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  Advised the patient to continue using this more frequently.  Advised the patient to using nasal rinses.  Given his degree of nasal obstruction he would benefit from a procedure by ENT.  Referral provided.    Orders:    [facility], Adult; Future    fexofenadine (ALLEGRA) 180 MG tablet; Take 1 tablet (180 mg total) by mouth daily    Ambulatory Referral to Otolaryngology; Future    Allergic conjunctivitis of both eyes  Hyperemic conjunctiva.  Has been using olopatadine eyedrops but very inconsistently.  Asked the patient to continue using olopatadine but twice daily at least.  Return to office for any worsening of current symptoms.  Orders:    [facility] Panel, Adult; Future    fexofenadine (ALLEGRA) 180 MG tablet; Take 1 tablet (180 mg total) by mouth daily           History of Present Illness   Patient presented to the clinic with complaints of sore throat, nasal congestion, sinus pressure that has been ongoing for about 6 days now.  He has a history of allergies to dust, pollen, cats, dogs.  Does not live in a heavily wooded area, works indoors without any significant exposure to chemicals or other allergens that are known to him.  His symptoms are also associated with a 1 day history of chills that he had about 6 days ago.  He was seen by urgent care PA via  telemedicine and was prescribed Augmentin.  Patient never picked that up.    Sinus Problem  Associated symptoms include chills, congestion, sinus pressure, sneezing and a sore throat. Pertinent negatives include no coughing, ear pain or shortness of breath.   Sore Throat   Associated symptoms include congestion. Pertinent negatives include no abdominal pain, coughing, ear pain, shortness of breath or vomiting.     Review of Systems   Constitutional:  Positive for chills. Negative for fever.   HENT:  Positive for congestion, postnasal drip, rhinorrhea, sinus pressure, sinus pain, sneezing and sore throat. Negative for ear pain.    Eyes:  Positive for discharge, redness and itching. Negative for pain and visual disturbance.   Respiratory:  Negative for cough and shortness of breath.    Cardiovascular:  Negative for chest pain and palpitations.   Gastrointestinal:  Negative for abdominal pain and vomiting.   Genitourinary:  Negative for dysuria and hematuria.   Musculoskeletal:  Negative for arthralgias and back pain.   Skin:  Negative for color change and rash.   Neurological:  Negative for seizures and syncope.   All other systems reviewed and are negative.      Objective   /72 (BP Location: Left arm, Patient Position: Sitting, Cuff Size: Standard)   Pulse 79   Wt 108 kg (237 lb)   SpO2 97%   BMI 34.01 kg/m²      Physical Exam  Vitals and nursing note reviewed.   Constitutional:       General: He is not in acute distress.     Appearance: He is well-developed.   HENT:      Head: Normocephalic and atraumatic.      Nose: Nasal deformity, septal deviation and mucosal edema present.      Right Nostril: Occlusion present.      Right Turbinates: Enlarged, swollen and pale.      Left Turbinates: Not enlarged, swollen or pale.      Right Sinus: Maxillary sinus tenderness and frontal sinus tenderness present.      Left Sinus: Frontal sinus tenderness present.     Eyes:      General: Lids are normal. Allergic shiner  present.      Conjunctiva/sclera:      Right eye: Right conjunctiva is injected.      Left eye: Left conjunctiva is injected.       Cardiovascular:      Rate and Rhythm: Normal rate and regular rhythm.      Heart sounds: No murmur heard.  Pulmonary:      Effort: Pulmonary effort is normal. No respiratory distress.      Breath sounds: Normal breath sounds.   Abdominal:      Palpations: Abdomen is soft.      Tenderness: There is no abdominal tenderness.     Musculoskeletal:         General: No swelling.      Cervical back: Neck supple.     Skin:     General: Skin is warm and dry.      Capillary Refill: Capillary refill takes less than 2 seconds.     Neurological:      Mental Status: He is alert.     Psychiatric:         Mood and Affect: Mood normal.       Administrative Statements   I have spent a total time of 35 minutes in caring for this patient on the day of the visit/encounter including Prognosis, Risks and benefits of tx options, Instructions for management, Patient and family education, Importance of tx compliance, Risk factor reductions, Impressions, Counseling / Coordination of care, Documenting in the medical record, Reviewing/placing orders in the medical record (including tests, medications, and/or procedures), and Obtaining or reviewing history  .

## 2025-05-27 NOTE — PROGRESS NOTES
"Daily Note     Today's date: 2025  Patient name: Sylvain Choi  : 1980  MRN: [id number]  Referring provider: Davin Arias PA*  Dx:   Encounter Diagnosis     ICD-10-CM    1. Status post left rotator cuff repair  Z98.890       2. Traumatic complete tear of left rotator cuff, initial encounter  S46.012A       3. Acute pain of left shoulder  M25.512                      Subjective: States his shoulder felt good over the weekend. Most of the restrictions feel present because of the arm fatiguing and lingering stiffness at end range OH.       Objective: See treatment diary below      Assessment: Tolerated treatment well. Patient arrived 10 mins late to session so TE was condensed today due to having to get to work. Emphasized scapular NM control and posterior chain strength.        Plan: Continue per plan of care.      Precautions: RC repair protocol in op notes (attached to chart)  Dx: LEFT RC repair and subacromial decompression 24    Manuals    Shoulder PROM    10 mins 10 mins 8 mins    8 mins   GHJ mobs                                       Neuro Re-Ed             Pendulums             Pulleys 1x15 flex and abd   2x15 flex and abd 2x15 flex and abd 2x15 flex and abd 1x10 flex and abd 1x15 flex and abd 1x15 flex and abd 1x15 flex and abd   Flexion door stretch        3x15\"    3x5 wall lift offs 3x15\"    3x5 wall lift offs 3x15\"    3x5 wall lift offs   Wall slides Both hands 2x10   2#  3x12 2#  3x12 2.5# 3x10 2x10 Both hands  2x10 Both hands 2x10 Both hands 2x10   Scaption to 90d np   2# 3x10 3#  3x10 3# 3x12 4# 3x10 4# 3x10 5# 3x8 5# 3x10   Sh abd to 90d np   2# 3x10 3#  3x10 3# 3x10 np 4# 2x10 4# 3x10 4# 3x10   Sh FF full range    3x10   3x10 3x10 np np                  Ther Ex             Neutral sh ER 5# cable 3x12   GTB  3x10 GTB  3x10 GTB 3x10  BTB 3x8 BTB 3x10 5# cable 3x10 5# cable 3x10   TB row 15# cable 3x10   Black  3x10 Black  3x10 Black 3x12 " Black 3x12 10# cable 3x10 15# cable 3x10 15# cable 3x10   TB sh ext    GTB  3x10 GTB  3x10 Black 3x10 Black 3x10      TB T YTB 3x10      YTB 2x10 YTB 2x10 YTB 3x10 YTB 3x10   TB Y YTB 2x10            S/L sh ER np   3#  3x10 3#  3x10 3# 3x10 3# 3x12 3# 3x15 3# 3x15 3# 3x15   Prone rows np      3# 2x10    Wide row   1# 1x10 3# 2x10    Wide row 1# 2x10 3# 3x10     Wide row 1# 2x10                 Ther Activity             OH press np        3# 3x5 3# 3x5                Gait Training                                       Modalities

## 2025-05-27 NOTE — PATIENT INSTRUCTIONS
Continue to use Nasonex nasal spray.  Please start using nasal rinses twice daily.  You have been prescribed fexofenadine to be taken once daily.  Continue to take olopatadine eyedrops.  You have been provided with a referral to ENT.  Please make an appointment at the earliest possible.  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

## 2025-06-03 ENCOUNTER — OFFICE VISIT (OUTPATIENT)
Dept: PHYSICAL THERAPY | Facility: CLINIC | Age: 45
End: 2025-06-03
Payer: COMMERCIAL

## 2025-06-03 DIAGNOSIS — Z98.890 STATUS POST LEFT ROTATOR CUFF REPAIR: Primary | ICD-10-CM

## 2025-06-03 DIAGNOSIS — M25.512 ACUTE PAIN OF LEFT SHOULDER: ICD-10-CM

## 2025-06-03 DIAGNOSIS — S46.012A TRAUMATIC COMPLETE TEAR OF LEFT ROTATOR CUFF, INITIAL ENCOUNTER: ICD-10-CM

## 2025-06-03 PROCEDURE — 97110 THERAPEUTIC EXERCISES: CPT

## 2025-06-03 PROCEDURE — 97112 NEUROMUSCULAR REEDUCATION: CPT

## 2025-06-03 NOTE — PROGRESS NOTES
"Daily Note     Today's date: 6/3/2025  Patient name: Sylvain Choi  : 1980  MRN: [id number]  Referring provider: Davin Arias PA*  Dx:   Encounter Diagnosis     ICD-10-CM    1. Status post left rotator cuff repair  Z98.890       2. Traumatic complete tear of left rotator cuff, initial encounter  S46.012A       3. Acute pain of left shoulder  M25.512           Start Time: 0800  Stop Time: 08  Total time in clinic (min): 38 minutes    Subjective: States his shoulder feels good, occasionally is achy when doing something OH but improves quickly once stopping.       Objective: See treatment diary below      Assessment: Tolerated treatment well. Patient req cueing for TE form with good carryover. Req inc rest intervals w/ increased resistance to maintain form d/t fatigue. Progressing as expected at this time.       Plan: Continue per plan of care.      Precautions: RC repair protocol in op notes (attached to chart)  Dx: LEFT RC repair and subacromial decompression 24    Manuals 5/27 6/3    5   Shoulder PROM      8 mins    8 mins   GHJ mobs                                       Neuro Re-Ed             Pendulums             Pulleys 1x15 flex and abd 1x15 flex and abd    2x15 flex and abd 1x10 flex and abd 1x15 flex and abd 1x15 flex and abd 1x15 flex and abd   Flexion door stretch        3x15\"    3x5 wall lift offs 3x15\"    3x5 wall lift offs 3x15\"    3x5 wall lift offs   Wall slides Both hands 2x10 Both hands 2x10    2.5# 3x10 2x10 Both hands  2x10 Both hands 2x10 Both hands 2x10   Scaption to 90d np 5# 3x10    3# 3x12 4# 3x10 4# 3x10 5# 3x8 5# 3x10   Sh abd to 90d np 5# 2x10    3# 3x10 np 4# 2x10 4# 3x10 4# 3x10   Sh FF full range      3x10 np np                  Ther Ex             Neutral sh ER 5# cable 3x12 5# cable 3x12    GTB 3x10  BTB 3x8 BTB 3x10 5# cable 3x10 5# cable 3x10   TB row 15# cable 3x10 15# cable 3x10    Black 3x12 Black 3x12 10# cable 3x10 15# cable 3x10 15# " cable 3x10   TB sh ext      Black 3x10 Black 3x10      TB T YTB 3x10 YTB 3x10     YTB 2x10 YTB 2x10 YTB 3x10 YTB 3x10   TB Y YTB 2x10 YTB 2x10           S/L sh ER np     3# 3x10 3# 3x12 3# 3x15 3# 3x15 3# 3x15   Prone rows np      3# 2x10    Wide row   1# 1x10 3# 2x10    Wide row 1# 2x10 3# 3x10     Wide row 1# 2x10                 Ther Activity             OH press np 5# 3x5        3# 3x5 3# 3x5                Gait Training                                       Modalities

## 2025-06-10 ENCOUNTER — OFFICE VISIT (OUTPATIENT)
Dept: PHYSICAL THERAPY | Facility: CLINIC | Age: 45
End: 2025-06-10
Payer: COMMERCIAL

## 2025-06-10 DIAGNOSIS — Z98.890 STATUS POST LEFT ROTATOR CUFF REPAIR: Primary | ICD-10-CM

## 2025-06-10 DIAGNOSIS — M25.512 ACUTE PAIN OF LEFT SHOULDER: ICD-10-CM

## 2025-06-10 DIAGNOSIS — S46.012A TRAUMATIC COMPLETE TEAR OF LEFT ROTATOR CUFF, INITIAL ENCOUNTER: ICD-10-CM

## 2025-06-10 PROCEDURE — 97112 NEUROMUSCULAR REEDUCATION: CPT

## 2025-06-10 PROCEDURE — 97110 THERAPEUTIC EXERCISES: CPT

## 2025-06-10 NOTE — HOME EXERCISE EDUCATION
Program_ID:[id number]   Access Code: [email or web address]  URL: [email or web address]  Date: 06-  Prepared By: Amina Lucas    Program Notes      Exercises      - Prone Shoulder Row - 1 x daily - 7 x weekly - 3 sets - 12 reps      - Prone Single Arm Shoulder Horizontal Abduction with Dumbbell - Palm Down - 1 x daily - 7 x weekly - 3 sets - 10 reps      - Sidelying Shoulder External Rotation - 1 x daily - 7 x weekly - 3 sets - 15 reps      - Standing Shoulder Abduction with Dumbbells - 1 x daily - 7 x weekly - 3 sets - 10 reps      - Standing Shoulder Flexion with Resistance - 1 x daily - 7 x weekly - 3 sets - 10 reps      - Shoulder Overhead Press in Flexion with Dumbbells - 1 x daily - 7 x weekly - 4 sets - 8 reps

## 2025-06-10 NOTE — PROGRESS NOTES
Daily Note     Today's date: 6/10/2025  Patient name: Sylvain Choi  : 1980  MRN: [id number]  Referring provider: Davin Arias PA*  Dx:   Encounter Diagnosis     ICD-10-CM    1. Status post left rotator cuff repair  Z98.890       2. Traumatic complete tear of left rotator cuff, initial encounter  S46.012A       3. Acute pain of left shoulder  M25.512           Start Time: 0800  Stop Time: 08  Total time in clinic (min): 38 minutes    Subjective: Pt states that his shoulder is doing good, no new complaints.       Objective: See treatment diary below      Assessment: Tolerated treatment well. Patient fatigued w/ inc resistance and OH movement patterns. Posterior chain strength is improving, demonstrating increased NM control.       Plan: Potential discharge next visit.      Precautions: RC repair protocol in op notes (attached to chart)  Dx: LEFT RC repair and subacromial decompression 24    Manuals 5/27 6/3 6/10          Shoulder PROM             GHJ mobs                                       Neuro Re-Ed             Pendulums             Pulleys 1x15 flex and abd 1x15 flex and abd 1x15 flex and abd          Flexion door stretch             Wall slides Both hands 2x10 Both hands 2x10 2# both hands 2x10          Scaption to 90d np 5# 3x10 5# 3x10          Sh abd to 90d np 5# 2x10 5# 2x10          Sh FF full range                          Ther Ex             Neutral sh ER 5# cable 3x12 5# cable 3x12 5# cable 3x12          TB row 15# cable 3x10 15# cable 3x10           TB sh ext             TB T YTB 3x10 YTB 3x10 RTB 2x10          TB Y YTB 2x10 YTB 2x10 RTB 2x10          S/L sh ER np  3# 3x12          Prone rows np  7# 2x10    Wide row 7# 2x10                       Ther Activity             OH press np 5# 3x5  3# 2x10                       Gait Training                                       Modalities

## 2025-06-17 ENCOUNTER — APPOINTMENT (OUTPATIENT)
Dept: PHYSICAL THERAPY | Facility: CLINIC | Age: 45
End: 2025-06-17
Payer: COMMERCIAL

## 2025-06-24 ENCOUNTER — OFFICE VISIT (OUTPATIENT)
Dept: PHYSICAL THERAPY | Facility: CLINIC | Age: 45
End: 2025-06-24
Payer: COMMERCIAL

## 2025-06-24 DIAGNOSIS — S46.012A TRAUMATIC COMPLETE TEAR OF LEFT ROTATOR CUFF, INITIAL ENCOUNTER: ICD-10-CM

## 2025-06-24 DIAGNOSIS — M25.512 ACUTE PAIN OF LEFT SHOULDER: ICD-10-CM

## 2025-06-24 DIAGNOSIS — Z98.890 STATUS POST LEFT ROTATOR CUFF REPAIR: Primary | ICD-10-CM

## 2025-06-24 PROCEDURE — 97112 NEUROMUSCULAR REEDUCATION: CPT

## 2025-06-24 NOTE — PROGRESS NOTES
Daily Note     Today's date: 2025  Patient name: Sylvain Choi  : 1980  MRN: [id number]  Referring provider: Davin Arias PA*  Dx:   Encounter Diagnosis     ICD-10-CM    1. Status post left rotator cuff repair  Z98.890       2. Traumatic complete tear of left rotator cuff, initial encounter  S46.012A       3. Acute pain of left shoulder  M25.512                      Subjective: Pt states his shoulder is doing well. He has the occasional twinge in the shoulder with quick movements but no pain that consistently lasts and he has been improving with time and home exercises.      Objective: See treatment diary below      Assessment: Session focused on progressing TE as tolerated and working increased volume of OH exercise. Pt has met all goals related to this episode of care at this time and is discharging from PT today. He has demonstrated competency with HEP focused on strength and stability and can continue progressing on his own.       Plan: Continue per plan of care.      Precautions: RC repair protocol in op notes (attached to chart)  Dx: LEFT RC repair and subacromial decompression 24    Manuals 5/27 6/3 6/10 6/24         Shoulder PROM             GHJ mobs                                       Neuro Re-Ed             Pendulums             Pulleys 1x15 flex and abd 1x15 flex and abd 1x15 flex and abd 1x15 flex and abd         Flexion door stretch             Wall slides Both hands 2x10 Both hands 2x10 2# both hands 2x10 3# 3x10         Scaption to 90d np 5# 3x10 5# 3x10          Sh abd to 90d np 5# 2x10 5# 2x10          Sh FF full range                          Ther Ex             Neutral sh ER 5# cable 3x12 5# cable 3x12 5# cable 3x12          TB row 15# cable 3x10 15# cable 3x10           TB sh ext             TB T YTB 3x10 YTB 3x10 RTB 2x10 RTB 3x10         TB Y YTB 2x10 YTB 2x10 RTB 2x10 RTB 3x10         S/L sh ER np  3# 3x12 4# 3x10         Prone rows np  7# 2x10    Wide row 7#  2x10 8# 3x10                          Ther Activity             OH press np 5# 3x5  3# 2x10 3# 3x10                      Gait Training                                       Modalities

## 2025-08-11 ENCOUNTER — OFFICE VISIT (OUTPATIENT)
Dept: OBGYN CLINIC | Facility: CLINIC | Age: 45
End: 2025-08-11
Payer: COMMERCIAL

## (undated) DEVICE — PREP SURGICAL PURPREP 26ML

## (undated) DEVICE — GLOVE INDICATOR PI UNDERGLOVE SZ 8 BLUE

## (undated) DEVICE — PROBE ABLATION  APOLLO RF 90 DEG MULTI PORT

## (undated) DEVICE — PACK PBDS SHOULDER ARTHROSCOPY RF

## (undated) DEVICE — BLADE SHAVER DISSECTOR 5MM 13CM COOLCUT

## (undated) DEVICE — 3M™ STERI-STRIP™ REINFORCED ADHESIVE SKIN CLOSURES, R1547, 1/2 IN X 4 IN (12 MM X 100 MM), 6 STRIPS/ENVELOPE: Brand: 3M™ STERI-STRIP™

## (undated) DEVICE — T-MAX DISPOSABLE FACE MASK 8 PER BOX

## (undated) DEVICE — INTENDED FOR TISSUE SEPARATION, AND OTHER PROCEDURES THAT REQUIRE A SHARP SURGICAL BLADE TO PUNCTURE OR CUT.: Brand: BARD-PARKER ® CARBON RIB-BACK BLADES

## (undated) DEVICE — 3M™ STERI-DRAPE™ U-DRAPE 1015: Brand: STERI-DRAPE™

## (undated) DEVICE — NEEDLE SUT SCORPION MEGALOADER

## (undated) DEVICE — SUT MONOCRYL 3-0 PS-2 27 IN Y427H

## (undated) DEVICE — TUBING ARTHROSCOPIC WAVE  MAIN PUMP

## (undated) DEVICE — CANNULA 7 X70MM THRD SEAL SIDE PORT

## (undated) DEVICE — BAG POLY CLEAR 12 X 8 X 30

## (undated) DEVICE — OCCLUSIVE GAUZE STRIP,3% BISMUTH TRIBROMOPHENATE IN PETROLATUM BLEND: Brand: XEROFORM

## (undated) DEVICE — POSITIONER TRIMANO LIMB BEACH CHAIR

## (undated) DEVICE — SPONGE SCRUB 4 PCT CHLORHEXIDINE

## (undated) DEVICE — KERLIX BANDAGE ROLL: Brand: KERLIX

## (undated) DEVICE — MAT ABSORBANT ARTHROSCOPY FLOOR 46 X 40 IN

## (undated) DEVICE — PUDDLE VAC

## (undated) DEVICE — GLOVE SRG BIOGEL 7.5

## (undated) DEVICE — TUBING SUCTION 5MM X 12 FT